# Patient Record
Sex: FEMALE | Race: WHITE | NOT HISPANIC OR LATINO | Employment: UNEMPLOYED | URBAN - METROPOLITAN AREA
[De-identification: names, ages, dates, MRNs, and addresses within clinical notes are randomized per-mention and may not be internally consistent; named-entity substitution may affect disease eponyms.]

---

## 2017-05-22 ENCOUNTER — TELEPHONE (OUTPATIENT)
Dept: FAMILY MEDICINE | Facility: CLINIC | Age: 42
End: 2017-05-22

## 2017-05-22 NOTE — TELEPHONE ENCOUNTER
----- Message from Kylie Casas MD sent at 5/19/2017 11:28 AM CDT -----  please notify pt need repeat mammogram in 6 months

## 2017-06-12 RX ORDER — PHENYTOIN SODIUM 100 MG/1
100 CAPSULE, EXTENDED RELEASE ORAL 2 TIMES DAILY
COMMUNITY
End: 2017-06-27 | Stop reason: SDUPTHER

## 2017-06-12 RX ORDER — OMEPRAZOLE 20 MG/1
20 CAPSULE, DELAYED RELEASE ORAL DAILY
COMMUNITY
End: 2017-06-27 | Stop reason: SDUPTHER

## 2017-06-12 RX ORDER — ONDANSETRON 4 MG/1
4 TABLET, ORALLY DISINTEGRATING ORAL EVERY 6 HOURS PRN
COMMUNITY
End: 2017-06-27 | Stop reason: SDUPTHER

## 2017-06-12 RX ORDER — ALBUTEROL SULFATE 90 UG/1
1 AEROSOL, METERED RESPIRATORY (INHALATION)
COMMUNITY
End: 2017-06-27 | Stop reason: SDUPTHER

## 2017-06-27 ENCOUNTER — OFFICE VISIT (OUTPATIENT)
Dept: FAMILY MEDICINE | Facility: CLINIC | Age: 42
End: 2017-06-27
Payer: MEDICAID

## 2017-06-27 VITALS
SYSTOLIC BLOOD PRESSURE: 114 MMHG | HEART RATE: 81 BPM | WEIGHT: 225 LBS | OXYGEN SATURATION: 96 % | BODY MASS INDEX: 38.41 KG/M2 | HEIGHT: 64 IN | DIASTOLIC BLOOD PRESSURE: 70 MMHG

## 2017-06-27 DIAGNOSIS — J44.9 CHRONIC OBSTRUCTIVE PULMONARY DISEASE, UNSPECIFIED COPD TYPE: ICD-10-CM

## 2017-06-27 DIAGNOSIS — G40.909 SEIZURE DISORDER: Primary | ICD-10-CM

## 2017-06-27 DIAGNOSIS — R11.0 NAUSEA: ICD-10-CM

## 2017-06-27 DIAGNOSIS — K21.9 GASTROESOPHAGEAL REFLUX DISEASE, ESOPHAGITIS PRESENCE NOT SPECIFIED: ICD-10-CM

## 2017-06-27 PROBLEM — M54.50 CHRONIC LOW BACK PAIN: Status: ACTIVE | Noted: 2017-06-27

## 2017-06-27 PROBLEM — G89.29 CHRONIC LOW BACK PAIN: Status: ACTIVE | Noted: 2017-06-27

## 2017-06-27 PROBLEM — R92.8 ABNORMAL FINDING ON MAMMOGRAPHY: Status: ACTIVE | Noted: 2017-06-27

## 2017-06-27 PROBLEM — F41.8 MIXED ANXIETY DEPRESSIVE DISORDER: Status: ACTIVE | Noted: 2017-06-27

## 2017-06-27 PROBLEM — F17.200 CURRENT SMOKER: Status: ACTIVE | Noted: 2017-06-27

## 2017-06-27 PROCEDURE — 99214 OFFICE O/P EST MOD 30 MIN: CPT | Mod: ,,, | Performed by: FAMILY MEDICINE

## 2017-06-27 RX ORDER — ALBUTEROL SULFATE 90 UG/1
1 AEROSOL, METERED RESPIRATORY (INHALATION)
Qty: 18 G | Refills: 5 | Status: SHIPPED | OUTPATIENT
Start: 2017-06-27 | End: 2017-12-12 | Stop reason: SDUPTHER

## 2017-06-27 RX ORDER — OMEPRAZOLE 20 MG/1
20 CAPSULE, DELAYED RELEASE ORAL DAILY
Qty: 30 CAPSULE | Refills: 5 | Status: SHIPPED | OUTPATIENT
Start: 2017-06-27 | End: 2019-05-16

## 2017-06-27 RX ORDER — PHENYTOIN SODIUM 100 MG/1
100 CAPSULE, EXTENDED RELEASE ORAL 2 TIMES DAILY
Qty: 60 CAPSULE | Refills: 5 | Status: SHIPPED | OUTPATIENT
Start: 2017-06-27 | End: 2017-12-12 | Stop reason: SDUPTHER

## 2017-06-27 RX ORDER — ONDANSETRON 4 MG/1
4 TABLET, ORALLY DISINTEGRATING ORAL EVERY 6 HOURS PRN
Qty: 30 TABLET | Refills: 2 | Status: SHIPPED | OUTPATIENT
Start: 2017-06-27 | End: 2017-12-12 | Stop reason: SDUPTHER

## 2017-06-27 NOTE — PROGRESS NOTES
Subjective:       Patient ID:  Jak Cody is a 41 y.o. female with multiple medical diagnoses as listed in the medical history and problem list that presents for COPD and Nausea  .      Chief Complaint: COPD and Nausea    COPD: She presents for evaluation and treatment of COPD. Symptoms include dry cough. Symptoms began several years ago, stable since that time.  She denies no cough.  Associated symptoms include The patient reports adherence to the treatment of albuterol inhalor.  COPD History:  The last exacerbation occurred several years ago. Typical exacerbations consist of dyspnea and usually last several day.        Nausea   This is a recurrent problem. The current episode started more than 1 year ago. The problem occurs intermittently. The problem has been waxing and waning. Pertinent negatives include no abdominal pain, anorexia, arthralgias, chest pain, chills, congestion, coughing, diaphoresis, fatigue, fever, headaches, myalgias, nausea, rash, sore throat, vomiting or weakness. Nothing aggravates the symptoms. Treatments tried: zofran prn  The treatment provided mild relief.   Seizures    This is a chronic problem. The current episode started more than 1 week ago. The problem has not changed since onset.There were 2 to 3 seizures. Pertinent negatives include no confusion, no headaches, no visual disturbance, no sore throat, no chest pain, no cough, no nausea, no vomiting and no diarrhea. Possible causes do not include missed seizure meds.     Review of Systems   Constitutional: Negative for appetite change, chills, diaphoresis, fatigue and fever.   HENT: Negative for congestion, ear pain, hearing loss, sore throat and trouble swallowing.    Eyes: Negative for photophobia, pain and visual disturbance.   Respiratory: Negative for cough, chest tightness and shortness of breath.    Cardiovascular: Negative for chest pain, palpitations and leg swelling.   Gastrointestinal: Negative for abdominal pain,  anorexia, blood in stool, constipation, diarrhea, nausea and vomiting.   Endocrine: Negative for cold intolerance and heat intolerance.   Genitourinary: Negative for difficulty urinating, flank pain, pelvic pain and vaginal pain.   Musculoskeletal: Negative for arthralgias and myalgias.   Skin: Negative for rash.   Allergic/Immunologic: Negative for immunocompromised state.   Neurological: Negative for dizziness, seizures, weakness, light-headedness and headaches.   Hematological: Negative for adenopathy. Does not bruise/bleed easily.   Psychiatric/Behavioral: Negative for confusion, self-injury and suicidal ideas.       Past Medical History:   Diagnosis Date    Anxiety     COPD (chronic obstructive pulmonary disease)     Depression     GERD (gastroesophageal reflux disease)     Seizures      Past Surgical History:   Procedure Laterality Date    FRACTURE SURGERY  2000    left femur     Family History   Problem Relation Age of Onset    Arthritis Mother     Asthma Mother     COPD Mother     Depression Mother     Diabetes Mother     Headaches Mother     Cancer Maternal Grandfather     Heart disease Paternal Grandfather     Heart attack Paternal Grandfather      Social History     Social History    Marital status: Single     Spouse name: N/A    Number of children: N/A    Years of education: N/A     Social History Main Topics    Smoking status: Current Every Day Smoker     Packs/day: 0.50     Types: Cigarettes    Smokeless tobacco: Never Used    Alcohol use No    Drug use: No    Sexual activity: Not Asked     Other Topics Concern    None     Social History Narrative    None     Current Outpatient Prescriptions   Medication Sig Dispense Refill    albuterol (VENTOLIN HFA) 90 mcg/actuation inhaler Inhale 1 puff into the lungs every 4 to 6 hours as needed for Wheezing. Rescue 18 g 5    omeprazole (PRILOSEC) 20 MG capsule Take 1 capsule (20 mg total) by mouth once daily. 30 capsule 5    ondansetron  (ZOFRAN-ODT) 4 MG TbDL Take 1 tablet (4 mg total) by mouth every 6 (six) hours as needed. 30 tablet 2    phenytoin (DILANTIN) 100 MG ER capsule Take 1 capsule (100 mg total) by mouth 2 (two) times daily. 60 capsule 5     No current facility-administered medications for this visit.      Review of patient's allergies indicates:  No Known Allergies  Objective:      Vitals:    06/27/17 1338   BP: 114/70   Pulse: 81     Physical Exam   Constitutional: She appears well-developed and well-nourished.   HENT:   Head: Normocephalic.   Eyes: Pupils are equal, round, and reactive to light. Right eye exhibits no discharge. Left eye exhibits no discharge. No scleral icterus.   Neck: No thyromegaly present.   Cardiovascular: Normal rate and regular rhythm.  Exam reveals no gallop and no friction rub.    No murmur heard.  Pulmonary/Chest: No respiratory distress. She has no wheezes. She has no rales. She exhibits no tenderness.   Abdominal: She exhibits no distension. There is no tenderness.   Musculoskeletal: She exhibits no tenderness.   Skin: No erythema.   Psychiatric: She has a normal mood and affect.       Assessment:       1. Seizure disorder    2. Nausea    3. Gastroesophageal reflux disease, esophagitis presence not specified    4. Chronic obstructive pulmonary disease, unspecified COPD type        Plan:       Seizure disorder  Comments:  > 10 years, need follow up with neurology.   Orders:  -     Ambulatory Referral to Neurology  -     phenytoin (DILANTIN) 100 MG ER capsule; Take 1 capsule (100 mg total) by mouth 2 (two) times daily.  Dispense: 60 capsule; Refill: 5    Nausea  Comments:  zofran PRN   Orders:  -     ondansetron (ZOFRAN-ODT) 4 MG TbDL; Take 1 tablet (4 mg total) by mouth every 6 (six) hours as needed.  Dispense: 30 tablet; Refill: 2    Gastroesophageal reflux disease, esophagitis presence not specified  Comments:  on PPI.   Orders:  -     omeprazole (PRILOSEC) 20 MG capsule; Take 1 capsule (20 mg total) by  mouth once daily.  Dispense: 30 capsule; Refill: 5    Chronic obstructive pulmonary disease, unspecified COPD type  Comments:  mild , only albuterol prn  Orders:  -     albuterol (VENTOLIN HFA) 90 mcg/actuation inhaler; Inhale 1 puff into the lungs every 4 to 6 hours as needed for Wheezing. Rescue  Dispense: 18 g; Refill: 5      Return in about 6 months (around 12/27/2017) for copd , seizure, , lab review, [place].      6/27/2017 Kylie Casas M.D.

## 2017-06-30 ENCOUNTER — TELEPHONE (OUTPATIENT)
Dept: FAMILY MEDICINE | Facility: CLINIC | Age: 42
End: 2017-06-30

## 2017-06-30 DIAGNOSIS — G40.909 SEIZURE DISORDER: Primary | ICD-10-CM

## 2017-06-30 DIAGNOSIS — F41.8 MIXED ANXIETY DEPRESSIVE DISORDER: ICD-10-CM

## 2017-07-07 ENCOUNTER — TELEPHONE (OUTPATIENT)
Dept: NEUROLOGY | Facility: CLINIC | Age: 42
End: 2017-07-07

## 2017-07-07 NOTE — TELEPHONE ENCOUNTER
----- Message from Jennifer Holcomb sent at 7/7/2017 10:09 AM CDT -----  Contact: Patient  Patient returning call. Please call back at [phone]. Thanks,

## 2017-07-07 NOTE — TELEPHONE ENCOUNTER
----- Message from Ingrid Bernardo sent at 7/6/2017  1:02 PM CDT -----  Contact: self  Patient has a referral in her chart from Dr. PETERSON   Please call her at  to schedule or advise.     Thanks

## 2017-07-07 NOTE — TELEPHONE ENCOUNTER
Left message to call back to discuss being placed on the wait list as we do not have any Medicaid openings at this time.

## 2017-07-18 ENCOUNTER — TELEPHONE (OUTPATIENT)
Dept: FAMILY MEDICINE | Facility: CLINIC | Age: 42
End: 2017-07-18

## 2017-07-18 DIAGNOSIS — G40.909 SEIZURE DISORDER: Primary | ICD-10-CM

## 2017-07-18 NOTE — TELEPHONE ENCOUNTER
When pt. went to schedule w/Dr. Zarate but he did not have any openings for Medicaid pts. Pt. called her ins. co. & they told her to go to Dr. Isael Kimble (neuro) in [place]. Pt. needs referral for that dr. He is w/[facility].

## 2017-08-11 ENCOUNTER — TELEPHONE (OUTPATIENT)
Dept: FAMILY MEDICINE | Facility: CLINIC | Age: 42
End: 2017-08-11

## 2017-08-11 NOTE — TELEPHONE ENCOUNTER
----- Message from Linh Ruby MA sent at 8/11/2017 10:36 AM CDT -----  Contact: Farhana @ [facility] [phone]  She called regarding this patient because she was referred to go see an Neurologist but they don't have any openings since the patient has [facility] medicaid. I think you need to call the patient and tell them to call her insurance company to see who else she can go see. If not she can call Farhana at the number above and schedule that for the beginning of the year.

## 2017-12-12 ENCOUNTER — OFFICE VISIT (OUTPATIENT)
Dept: FAMILY MEDICINE | Facility: CLINIC | Age: 42
End: 2017-12-12
Payer: MEDICAID

## 2017-12-12 VITALS
RESPIRATION RATE: 20 BRPM | BODY MASS INDEX: 37.39 KG/M2 | SYSTOLIC BLOOD PRESSURE: 104 MMHG | HEART RATE: 81 BPM | HEIGHT: 64 IN | DIASTOLIC BLOOD PRESSURE: 70 MMHG | WEIGHT: 219 LBS | OXYGEN SATURATION: 98 %

## 2017-12-12 DIAGNOSIS — J45.21 MILD INTERMITTENT ASTHMATIC BRONCHITIS WITH ACUTE EXACERBATION: ICD-10-CM

## 2017-12-12 DIAGNOSIS — K21.9 GASTROESOPHAGEAL REFLUX DISEASE WITHOUT ESOPHAGITIS: ICD-10-CM

## 2017-12-12 DIAGNOSIS — R11.0 NAUSEA: ICD-10-CM

## 2017-12-12 DIAGNOSIS — J44.9 CHRONIC OBSTRUCTIVE PULMONARY DISEASE, UNSPECIFIED COPD TYPE: ICD-10-CM

## 2017-12-12 DIAGNOSIS — G40.909 SEIZURE DISORDER: Primary | ICD-10-CM

## 2017-12-12 DIAGNOSIS — G40.909 SEIZURE DISORDER: ICD-10-CM

## 2017-12-12 DIAGNOSIS — Z72.0 TOBACCO ABUSE: ICD-10-CM

## 2017-12-12 PROCEDURE — 99214 OFFICE O/P EST MOD 30 MIN: CPT | Mod: ,,, | Performed by: INTERNAL MEDICINE

## 2017-12-12 RX ORDER — ONDANSETRON 4 MG/1
4 TABLET, ORALLY DISINTEGRATING ORAL EVERY 6 HOURS PRN
Qty: 30 TABLET | Refills: 2 | Status: SHIPPED | OUTPATIENT
Start: 2017-12-12

## 2017-12-12 RX ORDER — ALBUTEROL SULFATE 90 UG/1
1 AEROSOL, METERED RESPIRATORY (INHALATION)
Qty: 18 G | Refills: 5 | Status: SHIPPED | OUTPATIENT
Start: 2017-12-12 | End: 2019-03-01 | Stop reason: SDUPTHER

## 2017-12-12 RX ORDER — PHENYTOIN SODIUM 100 MG/1
100 CAPSULE, EXTENDED RELEASE ORAL 3 TIMES DAILY
Qty: 90 CAPSULE | Refills: 5 | Status: SHIPPED | OUTPATIENT
Start: 2017-12-12 | End: 2018-10-17 | Stop reason: SDUPTHER

## 2017-12-12 NOTE — PATIENT INSTRUCTIONS
Tips to Control Acid Reflux    To control acid reflux, youll need to make some basic diet and lifestyle changes. The simple steps outlined below may be all youll need to ease discomfort.  Watch what you eat  · Avoid fatty foods and spicy foods.  · Eat fewer acidic foods, such as citrus and tomato-based foods. These can increase symptoms.  · Limit drinking alcohol, caffeine, and fizzy beverages. All increase acid reflux.  · Try limiting chocolate, peppermint, and spearmint. These can worsen acid reflux in some people.  Watch when you eat  · Avoid lying down for 3 hours after eating.  · Do not snack before going to bed.  Raise your head  Raising your head and upper body by 4 to 6 inches helps limit reflux when youre lying down. Put blocks under the head of your bed frame to raise it.  Other changes  · Lose weight, if you need to  · Dont exercise near bedtime  · Avoid tight-fitting clothes  · Limit aspirin and ibuprofen  · Stop smoking   Date Last Reviewed: 7/1/2016  © 2412-2661 [employer]. [address] [place] [address], [place] [address]. All rights reserved. This information is not intended as a substitute for professional medical care. Always follow your healthcare professional's instructions.

## 2017-12-12 NOTE — PROGRESS NOTES
Subjective:       Patient ID: Jak Cody is a 42 y.o. female.    Chief Complaint: Asthma; Gastroesophageal Reflux; COPD; and Seizures    Asthma   There is no cough or shortness of breath. Associated symptoms include heartburn. Pertinent negatives include no appetite change, chest pain, ear pain, fever, headaches, myalgias, sore throat or trouble swallowing. Her past medical history is significant for asthma and COPD.   Gastroesophageal Reflux   She complains of heartburn. She reports no abdominal pain, no chest pain, no coughing, no nausea or no sore throat. The heartburn does not wake her from sleep. The symptoms are aggravated by smoking and stress. Pertinent negatives include no fatigue. She has tried a PPI for the symptoms.   COPD   This is a chronic problem. Pertinent negatives include no abdominal pain, arthralgias, chest pain, chills, congestion, coughing, diaphoresis, fatigue, fever, headaches, myalgias, nausea, rash, sore throat, vomiting or weakness.   Seizures    This is a chronic problem. The problem has not changed since onset.Pertinent negatives include no confusion, no headaches, no visual disturbance, no sore throat, no chest pain, no cough, no nausea, no vomiting and no diarrhea. Characteristics do not include eye blinking. There has been no fever.       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, controlled     Seizures      Social History     Social History    Marital status: Single     Spouse name: N/A    Number of children: N/A    Years of education: N/A     Occupational History    disabled      Social History Main Topics    Smoking status: Current Every Day Smoker     Packs/day: 0.50     Types: Cigarettes    Smokeless tobacco: Never Used    Alcohol use No    Drug use: No    Sexual activity: Not on file     Other Topics Concern    Not on  "file     Social History Narrative    No narrative on file     Past Surgical History:   Procedure Laterality Date    FRACTURE SURGERY  2000    left femur     Family History   Problem Relation Age of Onset    Arthritis Mother     Asthma Mother     COPD Mother     Depression Mother     Diabetes Mother     Headaches Mother     Cancer Maternal Grandfather     Heart disease Paternal Grandfather     Heart attack Paternal Grandfather        Review of Systems   Constitutional: Negative for appetite change, chills, diaphoresis, fatigue and fever.   HENT: Negative for congestion, ear pain, hearing loss, sore throat and trouble swallowing.    Eyes: Negative for photophobia, pain and visual disturbance.   Respiratory: Negative for cough, chest tightness and shortness of breath.         COPD/Asthma   Cardiovascular: Negative for chest pain, palpitations and leg swelling.   Gastrointestinal: Positive for heartburn. Negative for abdominal pain, blood in stool, constipation, diarrhea, nausea and vomiting.        GERD   Endocrine: Negative for cold intolerance and heat intolerance.   Genitourinary: Negative for difficulty urinating and flank pain.   Musculoskeletal: Negative for arthralgias and myalgias.   Skin: Negative for rash.   Allergic/Immunologic: Negative for immunocompromised state.   Neurological: Positive for seizures. Negative for dizziness, weakness, light-headedness and headaches.   Hematological: Negative for adenopathy. Does not bruise/bleed easily.   Psychiatric/Behavioral: Negative for confusion, self-injury and suicidal ideas.       Objective:       Vitals:    12/12/17 0837 12/12/17 0925   BP: 104/70    Pulse: 62 81   Resp: 16 20   SpO2: 98% 98%   Weight: 99.3 kg (219 lb)    Height: 5' 4" (1.626 m)    PF: 400 L/min      Physical Exam   Constitutional: She is oriented to person, place, and time. She appears well-developed and well-nourished.   HENT:   Head: Normocephalic.   Mouth/Throat: Abnormal " dentition (poor hygiene).   Eyes: Pupils are equal, round, and reactive to light. Right eye exhibits no discharge. Left eye exhibits no discharge. No scleral icterus.   Neck: No thyromegaly present.   Cardiovascular: Normal rate and regular rhythm.  Exam reveals no gallop and no friction rub.    No murmur heard.  Pulmonary/Chest: No respiratory distress. She has no wheezes. She has no rales. She exhibits no tenderness.   Abdominal: She exhibits no distension. There is no tenderness.   Musculoskeletal: Normal range of motion. She exhibits no tenderness.   Neurological: She is alert and oriented to person, place, and time.   Skin: No erythema.   Psychiatric: Her affect is labile.   Gest angry easily - non specific anger and not Physician directed       Assessment:       1. Seizure disorder    2. Gastroesophageal reflux disease without esophagitis    3. Mild intermittent asthmatic bronchitis with acute exacerbation    4. Chronic obstructive pulmonary disease, unspecified COPD type    5. Seizure disorder    6. Nausea    7. Tobacco abuse         Plan:           Seizure disorder  -     Comprehensive metabolic panel; Future; Expected date: 12/12/2017  -     CBC auto differential; Future; Expected date: 12/12/2017  -     Phenytoin level, total; Future; Expected date: 12/12/2017  -     phenytoin (DILANTIN) 100 MG ER capsule; Take 1 capsule (100 mg total) by mouth 3 (three) times daily.  Dispense: 90 capsule; Refill: 5    Gastroesophageal reflux disease without esophagitis    Mild intermittent asthmatic bronchitis with acute exacerbation    Chronic obstructive pulmonary disease, unspecified COPD type  Comments:  mild , only albuterol prn  Orders:  -     albuterol (VENTOLIN HFA) 90 mcg/actuation inhaler; Inhale 1 puff into the lungs every 4 to 6 hours as needed for Wheezing. Rescue  Dispense: 18 g; Refill: 5    Seizure disorder  Comments:  > 10 years, need follow up with neurology.   Orders:  -     Comprehensive metabolic  panel; Future; Expected date: 12/12/2017  -     CBC auto differential; Future; Expected date: 12/12/2017  -     Phenytoin level, total; Future; Expected date: 12/12/2017  -     phenytoin (DILANTIN) 100 MG ER capsule; Take 1 capsule (100 mg total) by mouth 3 (three) times daily.  Dispense: 90 capsule; Refill: 5    Nausea  Comments:  zofran PRN   Orders:  -     ondansetron (ZOFRAN-ODT) 4 MG TbDL; Take 1 tablet (4 mg total) by mouth every 6 (six) hours as needed.  Dispense: 30 tablet; Refill: 2    Tobacco abuse    Patient has been advised to slowly cut down on caffeine at this point. Eventually she'll have to quit smoking but she has to  one Cerda at a time. Cutting down on caffeine will help her with her reflux symptoms and less dependency upon nausea medications and omeprazole. This might help better absorption of seizure medication.    I would like her to get opinions from an epileptic neurologist or neurologist for her seizures but Medicaid is not accepted in this part of the [place].    I will see her back in 3 months time.    Pt seen with Ms Sexton

## 2018-02-22 ENCOUNTER — TELEPHONE (OUTPATIENT)
Dept: FAMILY MEDICINE | Facility: CLINIC | Age: 43
End: 2018-02-22

## 2018-02-22 DIAGNOSIS — G40.909 SEIZURE DISORDER: Primary | ICD-10-CM

## 2018-03-13 ENCOUNTER — OFFICE VISIT (OUTPATIENT)
Dept: FAMILY MEDICINE | Facility: CLINIC | Age: 43
End: 2018-03-13
Payer: MEDICAID

## 2018-03-13 VITALS
DIASTOLIC BLOOD PRESSURE: 73 MMHG | SYSTOLIC BLOOD PRESSURE: 101 MMHG | HEIGHT: 64 IN | HEART RATE: 70 BPM | BODY MASS INDEX: 37.73 KG/M2 | WEIGHT: 221 LBS

## 2018-03-13 DIAGNOSIS — M25.562 CHRONIC PAIN OF LEFT KNEE: Primary | ICD-10-CM

## 2018-03-13 DIAGNOSIS — G40.909 SEIZURE DISORDER: ICD-10-CM

## 2018-03-13 DIAGNOSIS — Z72.0 TOBACCO ABUSE: ICD-10-CM

## 2018-03-13 DIAGNOSIS — M25.552 PAIN OF LEFT HIP JOINT: ICD-10-CM

## 2018-03-13 DIAGNOSIS — G89.29 CHRONIC PAIN OF LEFT KNEE: Primary | ICD-10-CM

## 2018-03-13 DIAGNOSIS — F12.90 MARIJUANA USER: ICD-10-CM

## 2018-03-13 DIAGNOSIS — R41.3 MEMORY LOSS: ICD-10-CM

## 2018-03-13 PROCEDURE — 99214 OFFICE O/P EST MOD 30 MIN: CPT | Mod: ,,, | Performed by: INTERNAL MEDICINE

## 2018-03-13 RX ORDER — NAPROXEN 500 MG/1
500 TABLET ORAL 2 TIMES DAILY PRN
Qty: 30 TABLET | Refills: 2 | Status: SHIPPED | OUTPATIENT
Start: 2018-03-13 | End: 2019-01-07 | Stop reason: ALTCHOICE

## 2018-03-13 NOTE — PATIENT INSTRUCTIONS
Chronic Pain  Pain serves an important role. It lets you know something is wrong that needs your attention. When the body heals, pain normally goes away.  When pain lasts longer than six months, it is called chronic pain. This is pain that is present even after the body has healed. Chronic pain can cause mood problems and get in the way of your relationships and your daily life.  A number of conditions can cause chronic pain. Some of the more common include:  · Previous surgery  · An old injury  · Infection  · Diseases such as diabetes  · Nerve damage  · Back injury  · Arthritis  · Migraine or other headaches  · Fibromyalgia  · Cancer  Depression and stress can make chronic pain symptoms worse. In some cases, a cause for the pain cannot be found.   Treatment  Treatment can greatly reduce pain. In many cases, pain can become less severe, occur less often, and interfere less with your daily life. Chronic pain is often treated with a combination of medicines, therapies, and lifestyle changes. You will work closely with your healthcare provider to find a treatment plan that works best for you.  · Ask your healthcare provider for a referral to a pain management specialty center. These can provide the most recent and proven pain management strategies, along with emotional support and comprehensive services.  · Several different types of medicines may be prescribed for chronic pain. Work with your healthcare provider to develop a medicine plan that helps manage your pain.  · Physical therapy can be very effective in helping reduce certain types of chronic pain.  · Occupational therapy teaches you how to do routine tasks of daily living in ways that lessen your discomfort.  · Psychological therapy can help you cope better with stress and pain.  · Other therapies such as meditation, yoga, biofeedback, massage, and acupuncture can also help manage chronic pain.  · Changing certain habits can help reduce chronic pain. They  include:  ¨ Eating healthy  ¨ Developing an exercise routine  ¨ Getting enough sleep at night  ¨ Stopping smoking and limiting alcohol use  ¨ Losing excess weight  Follow-up care  Follow up with your healthcare provider as advised. Let your healthcare provider know if your current treatment plan is working or if changes are needed.  Resources  For more information, contact:  · American [place] for Headache Society www.achenet.org  · American Chronic Pain Association www.theacpa.org [phone]  Date Last Reviewed: 7/26/2015 © 2000-2017 [employer]. [address] [place] [address]. All rights reserved. This information is not intended as a substitute for professional medical care. Always follow your healthcare professional's instructions.

## 2018-03-26 ENCOUNTER — TELEPHONE (OUTPATIENT)
Dept: FAMILY MEDICINE | Facility: CLINIC | Age: 43
End: 2018-03-26

## 2018-03-26 DIAGNOSIS — G89.29 CHRONIC PAIN OF LEFT KNEE: ICD-10-CM

## 2018-03-26 DIAGNOSIS — M25.552 PAIN OF LEFT HIP JOINT: Primary | ICD-10-CM

## 2018-03-26 DIAGNOSIS — M25.562 CHRONIC PAIN OF LEFT KNEE: ICD-10-CM

## 2018-03-26 NOTE — TELEPHONE ENCOUNTER
----- Message from Reymundo Serra MD sent at 3/23/2018  7:42 PM CDT -----  Knee X Ray is ok. Hip shows some loosening of hardware. She may need to see ortho

## 2018-04-03 DIAGNOSIS — E55.9 HYPOVITAMINOSIS D: Primary | ICD-10-CM

## 2018-04-03 RX ORDER — ERGOCALCIFEROL 1.25 MG/1
50000 CAPSULE ORAL
Qty: 1 CAPSULE | Refills: 11
Start: 2018-04-03

## 2018-04-04 ENCOUNTER — OFFICE VISIT (OUTPATIENT)
Dept: ORTHOPEDICS | Facility: CLINIC | Age: 43
End: 2018-04-04
Payer: MEDICAID

## 2018-04-04 VITALS
HEIGHT: 64 IN | SYSTOLIC BLOOD PRESSURE: 106 MMHG | WEIGHT: 221 LBS | HEART RATE: 89 BPM | BODY MASS INDEX: 37.73 KG/M2 | DIASTOLIC BLOOD PRESSURE: 70 MMHG

## 2018-04-04 DIAGNOSIS — M25.552 PAIN OF LEFT HIP JOINT: Primary | ICD-10-CM

## 2018-04-04 PROCEDURE — 99203 OFFICE O/P NEW LOW 30 MIN: CPT | Mod: 25,,, | Performed by: ORTHOPAEDIC SURGERY

## 2018-04-04 PROCEDURE — 20610 DRAIN/INJ JOINT/BURSA W/O US: CPT | Mod: LT,,, | Performed by: ORTHOPAEDIC SURGERY

## 2018-04-04 RX ORDER — TRIAMCINOLONE ACETONIDE 40 MG/ML
40 INJECTION, SUSPENSION INTRA-ARTICULAR; INTRAMUSCULAR
Status: DISCONTINUED | OUTPATIENT
Start: 2018-04-04 | End: 2018-04-04 | Stop reason: HOSPADM

## 2018-04-04 RX ORDER — PHENYTOIN SODIUM 100 MG/1
100 CAPSULE, EXTENDED RELEASE ORAL
COMMUNITY
End: 2018-04-04

## 2018-04-04 RX ADMIN — TRIAMCINOLONE ACETONIDE 40 MG: 40 INJECTION, SUSPENSION INTRA-ARTICULAR; INTRAMUSCULAR at 09:04

## 2018-04-04 NOTE — LETTER
April 4, 2018      Reymundo Serra MD at [facility] - Orthopedic Surgery  [address] [place] [address]  [address] [place] [id number]  Phone: [phone]  Fax: [phone]          Patient: Jak Cody   MR Number: [id number]   YOB: 1975   Date of Visit: 4/4/2018       Dear Dr. Reymundo Serra:    Thank you for referring Jak Cody to me for evaluation. Attached you will find relevant portions of my assessment and plan of care.    If you have questions, please do not hesitate to call me. I look forward to following Jak Cody along with you.    Sincerely,    Mykel Nicole MD    Enclosure  CC:  No Recipients    If you would like to receive this communication electronically, please contact [email or web address] or [phone] to request more information on [facility] access.    For providers and/or their staff who would like to refer a patient to [facility], please contact us through our one-stop-shop provider referral line, [facility], at [phone].    If you feel you have received this communication in error or would no longer like to receive these types of communications, please e-mail [email or web address]

## 2018-04-04 NOTE — PROCEDURES
Large Joint Aspiration/Injection  Date/Time: 4/4/2018 9:11 AM  Performed by: MILDRED CENTENO  Authorized by: MILDRED CENTENO     Consent Done?:  Yes (Verbal)  Indications:  Pain  Procedure site marked: Yes    Timeout: Prior to procedure the correct patient, procedure, and site was verified      Location:  Hip  Site:  L greater trochanteric bursa  Prep: Patient was prepped and draped in usual sterile fashion    Needle size:  22 G  Medications:  40 mg triamcinolone acetonide 40 mg/mL  Patient tolerance:  Patient tolerated the procedure well with no immediate complications
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
## 2018-10-17 ENCOUNTER — OFFICE VISIT (OUTPATIENT)
Dept: FAMILY MEDICINE | Facility: CLINIC | Age: 43
End: 2018-10-17
Payer: MEDICAID

## 2018-10-17 VITALS
TEMPERATURE: 99 F | SYSTOLIC BLOOD PRESSURE: 109 MMHG | DIASTOLIC BLOOD PRESSURE: 77 MMHG | WEIGHT: 215 LBS | HEART RATE: 79 BPM | BODY MASS INDEX: 36.7 KG/M2 | HEIGHT: 64 IN

## 2018-10-17 DIAGNOSIS — G40.909 SEIZURE DISORDER: ICD-10-CM

## 2018-10-17 DIAGNOSIS — J02.9 ACUTE PHARYNGITIS, UNSPECIFIED ETIOLOGY: Primary | ICD-10-CM

## 2018-10-17 PROCEDURE — 99213 OFFICE O/P EST LOW 20 MIN: CPT | Mod: ,,, | Performed by: INTERNAL MEDICINE

## 2018-10-17 RX ORDER — AMOXICILLIN 500 MG/1
500 CAPSULE ORAL EVERY 8 HOURS
Qty: 21 CAPSULE | Refills: 0 | Status: SHIPPED | OUTPATIENT
Start: 2018-10-17 | End: 2019-01-07

## 2018-10-17 RX ORDER — PHENYTOIN SODIUM 100 MG/1
300 CAPSULE, EXTENDED RELEASE ORAL DAILY
Qty: 90 CAPSULE | Refills: 2 | Status: SHIPPED | OUTPATIENT
Start: 2018-10-17 | End: 2019-05-16 | Stop reason: SDUPTHER

## 2018-10-17 NOTE — PROGRESS NOTES
Subjective:       Patient ID: Jak Cody is a 42 y.o. female.    Chief Complaint: Sore Throat (3 days ) and Seizures    Sore Throat    This is a chronic problem. The current episode started in the past 7 days. The problem has been unchanged. Neither side of throat is experiencing more pain than the other. The maximum temperature recorded prior to her arrival was 100.4 - 100.9 F. Pertinent negatives include no abdominal pain, congestion, coughing, ear pain, shortness of breath or trouble swallowing. The treatment provided no relief.   Pt  states that she is immunocompromised. She needs antibiotics. As to why she is immune compromised is unclear to me. She does smoke cigarettes. She is not on any steroids or any defined immunocompromising conditions like IgE deficiency or AIDS. He is not known to have any malignancy and she is not on any chemotherapy.  Patient's history of seizure disorder has been noted. She is on Dilantin for the same. Seizure disorder has been for several years. She is currently stable. She has been advised to set up an appointment with a neurologist in past but has not really made any concrete attempts to do so.  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, controlled     Hypovitaminosis D 3/28/2018    As per Neurlogy notes at [facility] -Dr Rochelle Bennett MD neurology started on Vitamin D 50,000    Seizures      Social History     Socioeconomic History    Marital status: Single     Spouse name: Not on file    Number of children: Not on file    Years of education: Not on file    Highest education level: Not on file   Social Needs    Financial resource strain: Not on file    Food insecurity - worry: Not on file    Food insecurity - inability: Not on file    Transportation needs - medical: Not on file    Transportation needs - non-medical: Not on  "file   Occupational History    Occupation: disabled   Tobacco Use    Smoking status: Current Every Day Smoker     Packs/day: 0.50     Types: Cigarettes    Smokeless tobacco: Never Used   Substance and Sexual Activity    Alcohol use: No    Drug use: No    Sexual activity: Not on file   Other Topics Concern    Not on file   Social History Narrative    Not on file     Past Surgical History:   Procedure Laterality Date    FRACTURE SURGERY  2000    left femur     Family History   Problem Relation Age of Onset    Arthritis Mother     Asthma Mother     COPD Mother     Depression Mother     Diabetes Mother     Headaches Mother     Cancer Maternal Grandfather     Heart disease Paternal Grandfather     Heart attack Paternal Grandfather        Review of Systems   Constitutional: Negative for appetite change, chills, diaphoresis, fatigue and fever.   HENT: Positive for sore throat. Negative for congestion, ear pain, hearing loss and trouble swallowing.    Eyes: Negative for photophobia, pain and visual disturbance.   Respiratory: Negative for cough, chest tightness and shortness of breath.         COPD/Asthma   Cardiovascular: Negative for chest pain, palpitations and leg swelling.   Gastrointestinal: Negative for abdominal pain and blood in stool.        GERD   Genitourinary: Negative for difficulty urinating.   Musculoskeletal: Negative for arthralgias and myalgias.   Skin: Negative for rash.   Neurological: Positive for seizures. Negative for weakness.   Hematological: Negative for adenopathy. Does not bruise/bleed easily.   Psychiatric/Behavioral: Negative for self-injury and suicidal ideas.         Objective:      Blood pressure 109/77, pulse 79, temperature 98.9 °F (37.2 °C), height 5' 4" (1.626 m), weight 97.5 kg (215 lb), last menstrual period 09/19/2018. Body mass index is 36.9 kg/m².  Physical Exam   Constitutional: She appears well-developed and well-nourished.   HENT:   Head: Normocephalic. "   Mouth/Throat: Abnormal dentition (poor hygiene). Posterior oropharyngeal erythema present. No oropharyngeal exudate.   Eyes: Pupils are equal, round, and reactive to light. Right eye exhibits no discharge. No scleral icterus.   Neck: No thyromegaly present.   Cardiovascular: Normal rate and regular rhythm.   Pulmonary/Chest: No respiratory distress. She has no wheezes. She has no rales.   Abdominal: She exhibits no distension. There is no tenderness.   Musculoskeletal: She exhibits no tenderness.   Neurological: She is alert.   Skin: No erythema.         Assessment:       1. Acute pharyngitis, unspecified etiology    2. Seizure disorder           No visits with results within 3 Month(s) from this visit.   Latest known visit with results is:   No results found for any previous visit.         Plan:           Acute pharyngitis, unspecified etiology    Seizure disorder  -     phenytoin (DILANTIN) 100 MG ER capsule; Take 3 capsules (300 mg total) by mouth once daily.  Dispense: 90 capsule; Refill: 2      Patient does have some pharyngitis and erythema. No definite evidence of strep.    She feels that if she does not get amoxicillin the infection was spread all over her body.    She has been referred on Dilantin also but she needs to set up an appointment for that neurologist to continue this care.    Otherwise I will see her back in about 6 months time.  Patient has been advised to quit smoking.    Patient seen with Ms. Jacobsone as chaperone.    Current Outpatient Medications:     albuterol (VENTOLIN HFA) 90 mcg/actuation inhaler, Inhale 1 puff into the lungs every 4 to 6 hours as needed for Wheezing. Rescue, Disp: 18 g, Rfl: 5    ergocalciferol (ERGOCALCIFEROL) 50,000 unit Cap, Take 1 capsule (50,000 Units total) by mouth every 7 days., Disp: 1 capsule, Rfl: 11    naproxen (EC NAPROSYN) 500 MG EC tablet, Take 1 tablet (500 mg total) by mouth 2 (two) times daily as needed (knee pain)., Disp: 30 tablet, Rfl: 2     omeprazole (PRILOSEC) 20 MG capsule, Take 1 capsule (20 mg total) by mouth once daily., Disp: 30 capsule, Rfl: 5    ondansetron (ZOFRAN-ODT) 4 MG TbDL, Take 1 tablet (4 mg total) by mouth every 6 (six) hours as needed., Disp: 30 tablet, Rfl: 2    phenytoin (DILANTIN) 100 MG ER capsule, Take 3 capsules (300 mg total) by mouth once daily., Disp: 90 capsule, Rfl: 2

## 2018-10-17 NOTE — PATIENT INSTRUCTIONS

## 2019-01-07 ENCOUNTER — OFFICE VISIT (OUTPATIENT)
Dept: FAMILY MEDICINE | Facility: CLINIC | Age: 44
End: 2019-01-07
Payer: MEDICAID

## 2019-01-07 VITALS
HEART RATE: 118 BPM | SYSTOLIC BLOOD PRESSURE: 124 MMHG | DIASTOLIC BLOOD PRESSURE: 83 MMHG | HEIGHT: 64 IN | WEIGHT: 218 LBS | BODY MASS INDEX: 37.22 KG/M2

## 2019-01-07 DIAGNOSIS — M25.531 RIGHT WRIST PAIN: Primary | ICD-10-CM

## 2019-01-07 DIAGNOSIS — G56.01 CARPAL TUNNEL SYNDROME OF RIGHT WRIST: ICD-10-CM

## 2019-01-07 PROCEDURE — 99214 PR OFFICE/OUTPT VISIT, EST, LEVL IV, 30-39 MIN: ICD-10-PCS | Mod: ,,, | Performed by: INTERNAL MEDICINE

## 2019-01-07 PROCEDURE — 99214 OFFICE O/P EST MOD 30 MIN: CPT | Mod: ,,, | Performed by: INTERNAL MEDICINE

## 2019-01-07 RX ORDER — MELOXICAM 15 MG/1
15 TABLET ORAL DAILY
Qty: 15 TABLET | Refills: 0 | Status: SHIPPED | OUTPATIENT
Start: 2019-01-07 | End: 2019-05-16

## 2019-01-07 RX ORDER — PREDNISONE 20 MG/1
20 TABLET ORAL DAILY
Qty: 5 TABLET | Refills: 0 | Status: SHIPPED | OUTPATIENT
Start: 2019-01-07 | End: 2019-01-12

## 2019-01-07 NOTE — PATIENT INSTRUCTIONS
Arthralgia    Arthralgia is the term for pain in or around the joint. It is a symptom, not a disease. This pain may involve one or more joints. In some cases, the pain moves from joint to joint.  There are many causes for joint pain. These include:  · Injury  · Osteoarthritis (wearing out of the joint surface)  · Gout (inflammation of the joint due to crystals in the joint fluid)  · Infection inside the joint    · Bursitis (inflammation of the fluid-filled sacs around the joint)  · Autoimmune disorders such as rheumatoid arthritis or lupus  · Tendonitis (inflammation of chords that attach muscle to bone)  Home care  · Rest the involved joint(s) until your symptoms improve.   · You may be prescribed pain medicine. If none is prescribed, you may use acetaminophen or ibuprofen to control pain and inflammation.  Follow-up care  Follow up with your healthcare provider or as advised.  When to seek medical advice  Contact your healthcare provider right away if any of the following occurs:  · Pain, swelling, or redness of joint increases  · Pain worsens or recurs after a period of improvement  · Pain moves to other joints  · You cannot bear weight on the affected joint   · You cannot move the affected joint  · Joint appears deformed  · New rash appears  · Fever of 100.4ºF (38ºC) or higher, or as directed by your healthcare provider  Date Last Reviewed: 3/1/2017  © 2869-8630 [employer]. [address] [place] [address], [place] [id number]. All rights reserved. This information is not intended as a substitute for professional medical care. Always follow your healthcare professional's instructions.        Carpal Tunnel Syndrome    Carpal tunnel syndrome is a painful condition of the wrist and arm. It is caused by pressure on the median nerve.  The median nerve is one of the nerves that give feeling and movement to the hand. It passes through a tunnel in the wrist called the carpal tunnel. This tunnel is made up of bones  and ligaments. Narrowing of this tunnel or swelling of the tissues inside the tunnel puts pressure on the median nerve. This causes numbness, pins and needles, or electric shooting pains in your hand and forearm. Often the pain is worse at night and may wake you when you are asleep.  Carpal tunnel syndrome may occur during pregnancy and with use of birth control pills. It is more common in workers who must often bend their wrists. It is also common in people who work with power tools that cause strong vibrations.  Home care  · Rest the painful wrist. Avoid repeated bending of the wrist back and forth. This puts pressure on the median nerve. Avoid using power tools with strong vibrations.  · If you were given a splint, wear it at night while you sleep. You may also wear it during the day for comfort.  · Move your fingers and wrists often to avoid stiffness.  · Elevate your arms on pillows when you lie down.  · Try using the unaffected hand more.  · Try not to hold your wrists in a bent, downward position.  · Sometimes changes in the work place may ease symptoms. If you type most of the day, it may help to change the position of your keyboard or add a wrist support. Your wrist should be in a neutral position and not bent back when typing.  · You may use over-the-counter pain medicine to treat pain and inflammation, unless another medicine was prescribed. Anti-inflammatory pain medicines, such as ibuprofen or naproxen may be more effective than acetaminophen, which treats pain, but not inflammation. If you have chronic liver or kidney disease or ever had a stomach ulcer or GI bleeding, talk with your doctor before using these medicines.  · Opioid pain medicine will only give temporary relief and does not treat the problem. If pain continues, you may need a shot of a steroid drug into your wrist.  · If the above methods fail, you may need surgery. This will open the carpal tunnel and release the pressure on the trapped  nerve.  Follow-up care  Follow up with your healthcare provider, or as advised, if the pain doesnt begin to improve within the next week.  If X-rays were taken, you will be notified of any new findings that may affect your care.  When to seek medical advice  Call your healthcare provider right away if any of these occur:  · Pain not improving with the above treatment  · Fingers or hand become cold, blue, numb, or tingly  · Your whole arm becomes swollen or weak  Date Last Reviewed: 11/23/2015 © 2000-2017 [employer]. [address] [place] [id number]. All rights reserved. This information is not intended as a substitute for professional medical care. Always follow your healthcare professional's instructions.

## 2019-01-07 NOTE — PROGRESS NOTES
"Subjective:       Patient ID: Jak Cody is a 43 y.o. female.    Chief Complaint: Hand Pain (numbness and tingle )    Patient comes for evaluation of right hand and wrist pain. She does not recall any trauma. This has been going on for weeks to months. The pain seems to be starting from the wrist joint" to all the fingers. It also goes to the elbow. Thus far she has not been formally diagnosed with arthritis or any autoimmune disorder. She states that all her fingers are numb. She has not been diagnosed with peripheral vascular disease. She does smoke cigarettes.      Hand Pain    The incident occurred 12 to 24 hours ago. There was no injury mechanism. The pain is present in the right wrist. The quality of the pain is described as burning, shooting and aching. Associated symptoms include numbness. Pertinent negatives include no chest pain. The treatment provided no relief.       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, controlled     Hypovitaminosis D 3/28/2018    As per Neurlogy notes at [facility] -Dr Rochelle Bennett MD neurology started on Vitamin D 50,000    Seizures      Social History     Socioeconomic History    Marital status: Single     Spouse name: Not on file    Number of children: Not on file    Years of education: Not on file    Highest education level: Not on file   Social Needs    Financial resource strain: Not on file    Food insecurity - worry: Not on file    Food insecurity - inability: Not on file    Transportation needs - medical: Not on file    Transportation needs - non-medical: Not on file   Occupational History    Occupation: disabled   Tobacco Use    Smoking status: Current Every Day Smoker     Packs/day: 0.50     Types: Cigarettes    Smokeless tobacco: Never Used   Substance and Sexual Activity    Alcohol use: No    Drug use: No " "   Sexual activity: Not on file   Other Topics Concern    Not on file   Social History Narrative    Not on file     Past Surgical History:   Procedure Laterality Date    FRACTURE SURGERY  2000    left femur     Family History   Problem Relation Age of Onset    Arthritis Mother     Asthma Mother     COPD Mother     Depression Mother     Diabetes Mother     Headaches Mother     Cancer Maternal Grandfather     Heart disease Paternal Grandfather     Heart attack Paternal Grandfather        Review of Systems   Constitutional: Positive for activity change. Negative for chills and diaphoresis.   HENT: Negative for congestion and mouth sores.    Respiratory: Negative for choking and chest tightness.    Cardiovascular: Negative for chest pain and leg swelling.   Gastrointestinal: Negative for blood in stool, diarrhea, nausea and vomiting.   Musculoskeletal: Positive for arthralgias.        Pain, pain ,pain   Skin: Negative for color change, pallor and rash.   Neurological: Positive for numbness.         Objective:      Blood pressure 124/83, pulse (!) 118, height 5' 4" (1.626 m), weight 98.9 kg (218 lb). Body mass index is 37.42 kg/m².  Physical Exam   Constitutional: She appears well-developed and well-nourished.   HENT:   Head: Normocephalic.   Mouth/Throat: Abnormal dentition (poor hygiene). Posterior oropharyngeal erythema present. No oropharyngeal exudate.   Eyes: Pupils are equal, round, and reactive to light. Right eye exhibits no discharge. No scleral icterus.   Neck: No thyromegaly present.   Cardiovascular: Normal rate and regular rhythm.   Pulmonary/Chest: No respiratory distress. She has no wheezes. She has no rales.   Abdominal: She exhibits no distension. There is no tenderness.   Musculoskeletal: She exhibits no tenderness.        Right hand: She exhibits normal capillary refill. Decreased sensation is not present in the ulnar distribution and is not present in the medial distribution. She " exhibits no finger abduction and no wrist extension trouble.   Range of motion of right wrist joint is slightly painful. I do not see any obvious swelling. On extremes of flexion and extension do she does experience some pain. She describes as numbness in all the fingers.   Neurological: She is alert.   Skin: No erythema.         Assessment:       1. Right wrist pain    2. Carpal tunnel syndrome of right wrist           No visits with results within 3 Month(s) from this visit.   Latest known visit with results is:   No results found for any previous visit.         Plan:           Right wrist pain  -     WRIST BRACE FOR HOME USE  -     X-Ray Wrist Complete Right; Future; Expected date: 01/07/2019  -     Rheumatoid factor; Future; Expected date: 01/07/2019  -     Sedimentation rate; Future; Expected date: 01/07/2019  -     CBC auto differential; Future; Expected date: 01/07/2019  -     meloxicam (MOBIC) 15 MG tablet; Take 1 tablet (15 mg total) by mouth once daily. Take with meals  Dispense: 15 tablet; Refill: 0  -     predniSONE (DELTASONE) 20 MG tablet; Take 1 tablet (20 mg total) by mouth once daily. for 5 days  Dispense: 5 tablet; Refill: 0    Carpal tunnel syndrome of right wrist      Patient has right wrist pain with the possibility of carpal tunnel syndrome. However she has numbness in all the fingers.    Later on she changed her statement and she stated that her numbness is in the thumb and the second third and fourth finger after she saw the picture of carpal tunnel nerve distribution of numbness.    Hence with the conflicting reporting symptoms it's difficult to make an appropriate diagnosis.    At this point I will treat her with a wrist splint, anti-inflammatory medication and prednisone for a few days.    Check rheumatoid factor, sedimentation rate and CBC. Check wrist x-ray for any occult fracture. May need orthopedic evaluation.    Carpal tunnel syndrome remains a presumptive diagnosis at this  point.    Pt seen with Chaperone      Current Outpatient Medications:     albuterol (VENTOLIN HFA) 90 mcg/actuation inhaler, Inhale 1 puff into the lungs every 4 to 6 hours as needed for Wheezing. Rescue, Disp: 18 g, Rfl: 5    ergocalciferol (ERGOCALCIFEROL) 50,000 unit Cap, Take 1 capsule (50,000 Units total) by mouth every 7 days., Disp: 1 capsule, Rfl: 11    omeprazole (PRILOSEC) 20 MG capsule, Take 1 capsule (20 mg total) by mouth once daily., Disp: 30 capsule, Rfl: 5    ondansetron (ZOFRAN-ODT) 4 MG TbDL, Take 1 tablet (4 mg total) by mouth every 6 (six) hours as needed., Disp: 30 tablet, Rfl: 2    phenytoin (DILANTIN) 100 MG ER capsule, Take 3 capsules (300 mg total) by mouth once daily., Disp: 90 capsule, Rfl: 2    meloxicam (MOBIC) 15 MG tablet, Take 1 tablet (15 mg total) by mouth once daily. Take with meals, Disp: 15 tablet, Rfl: 0    predniSONE (DELTASONE) 20 MG tablet, Take 1 tablet (20 mg total) by mouth once daily. for 5 days, Disp: 5 tablet, Rfl: 0

## 2019-01-08 ENCOUNTER — TELEPHONE (OUTPATIENT)
Dept: FAMILY MEDICINE | Facility: CLINIC | Age: 44
End: 2019-01-08

## 2019-01-08 LAB
BASOPHILS NFR BLD: 0 K/UL (ref 0–0.2)
BASOPHILS NFR BLD: 0.4 %
EOSINOPHIL NFR BLD: 0.4 K/UL (ref 0–0.7)
EOSINOPHIL NFR BLD: 4.5 %
ERYTHROCYTE [DISTWIDTH] IN BLOOD BY AUTOMATED COUNT: 13.5 % (ref 11.7–14.9)
GRAN #: 5 K/UL (ref 1.4–6.5)
GRAN%: 63.6 %
HCT VFR BLD AUTO: 43.2 % (ref 36–48)
HGB BLD-MCNC: 13.8 G/DL (ref 12–15)
IMMATURE GRANS (ABS): 0 K/UL (ref 0–1)
IMMATURE GRANULOCYTES: 0.3 %
LYMPH #: 1.9 K/UL (ref 1.2–3.4)
LYMPH%: 24.2 %
MCH RBC QN AUTO: 29.7 PG (ref 25–35)
MCHC RBC AUTO-ENTMCNC: 31.9 G/DL (ref 31–36)
MCV RBC AUTO: 92.9 FL (ref 79–98)
MONO #: 0.6 K/UL (ref 0.1–0.6)
MONO%: 7 %
NUCLEATED RBCS: 0 %
PLATELET # BLD AUTO: 315 K/UL (ref 140–440)
PMV BLD AUTO: 9.7 FL (ref 8.8–12.7)
RBC # BLD AUTO: 4.65 M/UL (ref 3.5–5.5)
WBC # BLD AUTO: 7.8 K/UL (ref 5–10)

## 2019-01-08 NOTE — TELEPHONE ENCOUNTER
----- Message from Reymundo Serra MD sent at 1/8/2019 11:55 AM CST -----  Notify patient that her blood counts are okay. Test for rheumatoid factor and other tests are still pending. Wrist x-ray was okay.

## 2019-01-08 NOTE — TELEPHONE ENCOUNTER
----- Message from Reymundo Serra MD sent at 1/8/2019 11:54 AM CST -----  Notify patient that her chest x-ray was okay. No bony swelling or evidence of significant arthritis. No fractures or dislocations. She needs to continue using the splint for 6 weeks.

## 2019-01-08 NOTE — PROGRESS NOTES
Notify patient that her blood counts are okay. Test for rheumatoid factor and other tests are still pending. Wrist x-ray was okay.

## 2019-01-08 NOTE — PROGRESS NOTES
Notify patient that her chest x-ray was okay. No bony swelling or evidence of significant arthritis. No fractures or dislocations. She needs to continue using the splint for 6 weeks.

## 2019-01-10 ENCOUNTER — TELEPHONE (OUTPATIENT)
Dept: FAMILY MEDICINE | Facility: CLINIC | Age: 44
End: 2019-01-10

## 2019-01-10 LAB — RHEUMATOID FACT SERPL-ACNC: 69.3 IU/ML (ref 0–13.9)

## 2019-01-14 ENCOUNTER — OFFICE VISIT (OUTPATIENT)
Dept: FAMILY MEDICINE | Facility: CLINIC | Age: 44
End: 2019-01-14
Payer: MEDICAID

## 2019-01-14 VITALS
WEIGHT: 218 LBS | HEIGHT: 64 IN | BODY MASS INDEX: 37.22 KG/M2 | DIASTOLIC BLOOD PRESSURE: 88 MMHG | HEART RATE: 85 BPM | SYSTOLIC BLOOD PRESSURE: 135 MMHG

## 2019-01-14 DIAGNOSIS — M05.731 RHEUMATOID ARTHRITIS INVOLVING RIGHT WRIST WITH POSITIVE RHEUMATOID FACTOR: ICD-10-CM

## 2019-01-14 DIAGNOSIS — M25.531 RIGHT WRIST PAIN: Primary | ICD-10-CM

## 2019-01-14 PROCEDURE — 99213 OFFICE O/P EST LOW 20 MIN: CPT | Mod: ,,, | Performed by: INTERNAL MEDICINE

## 2019-01-14 PROCEDURE — 99213 PR OFFICE/OUTPT VISIT, EST, LEVL III, 20-29 MIN: ICD-10-PCS | Mod: ,,, | Performed by: INTERNAL MEDICINE

## 2019-01-14 NOTE — PROGRESS NOTES
Subjective:       Patient ID: Jak Cody is a 43 y.o. female.    Chief Complaint: Arthritis (lab review ) and Hand Pain    Ms. Jak Cody is a 43-year-old  female who comes for follow-up. This is a follow-up on previous visit recently for right wrist and hand pain which is moderately significant. She was initiated on meloxicam which she has really not filled does now. She was also advised to use a wrist splint which she has not done so far. I done some initial investigations including x-ray and rheumatoid factor. While the x-ray is normal, the rheumatoid factor is significantly positive. She does recall that her grandmother was diagnosed with rheumatoid arthritis. No other family member she recalls the diagnosis of rheumatoid arthritis.    While, during the last visit she had indicated that the significant pain is only restricted to the right wrist and feeling of numbness in the fingers, today she extrapolates it further by stating that all her body hurts.      Arthritis   Presents for follow-up visit. She complains of pain and stiffness. The symptoms have been stable. Associated symptoms include fatigue and pain while resting. Pertinent negatives include no diarrhea, rash or Raynaud's syndrome.       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, controlled     Hypovitaminosis D 3/28/2018    As per Neurlogy notes at [facility] -Dr Rochelle Bennett MD neurology started on Vitamin D 50,000    Seizures      Social History     Socioeconomic History    Marital status: Single     Spouse name: Not on file    Number of children: Not on file    Years of education: Not on file    Highest education level: Not on file   Social Needs    Financial resource strain: Not on file    Food insecurity - worry: Not on file    Food insecurity - inability: Not on file     "Transportation needs - medical: Not on file    Transportation needs - non-medical: Not on file   Occupational History    Occupation: disabled   Tobacco Use    Smoking status: Current Every Day Smoker     Packs/day: 0.50     Types: Cigarettes    Smokeless tobacco: Never Used   Substance and Sexual Activity    Alcohol use: No    Drug use: No    Sexual activity: Not on file   Other Topics Concern    Not on file   Social History Narrative    Not on file     Past Surgical History:   Procedure Laterality Date    FRACTURE SURGERY  2000    left femur     Family History   Problem Relation Age of Onset    Arthritis Mother     Asthma Mother     COPD Mother     Depression Mother     Diabetes Mother     Headaches Mother     Cancer Maternal Grandfather     Heart disease Paternal Grandfather     Heart attack Paternal Grandfather        Review of Systems   Constitutional: Positive for activity change and fatigue. Negative for chills and diaphoresis.   HENT: Negative for congestion and mouth sores.    Eyes: Positive for itching. Negative for discharge.   Respiratory: Negative for choking and chest tightness.    Cardiovascular: Negative for chest pain and leg swelling.   Gastrointestinal: Negative for blood in stool, diarrhea, nausea and vomiting.   Endocrine: Negative for cold intolerance and heat intolerance.   Genitourinary: Negative for difficulty urinating.   Musculoskeletal: Positive for arthralgias, arthritis, myalgias, neck pain and stiffness.        Pain, pain ,pain- right factor was positive she states that all her body hurts. After low she was stating that her only wrist and hand hurts.   Skin: Negative for color change, pallor and rash.   Neurological: Positive for numbness.   Hematological: Negative for adenopathy. Does not bruise/bleed easily.   Psychiatric/Behavioral: Positive for dysphoric mood.         Objective:      Blood pressure 135/88, pulse 85, height 5' 4" (1.626 m), weight 98.9 kg (218 lb), " last menstrual period 01/14/2019. Body mass index is 37.42 kg/m².  Physical Exam   Constitutional: She is oriented to person, place, and time. She appears well-developed. She is cooperative. No distress.   BMI is 37.   HENT:   Head: Normocephalic and atraumatic.   Right Ear: Tympanic membrane normal.   Left Ear: Tympanic membrane normal.   Eyes: Conjunctivae, EOM and lids are normal. Pupils are equal, round, and reactive to light. Lids are everted and swept, no foreign bodies found. Right pupil is round and reactive. Left pupil is round and reactive.   Neck: Trachea normal and normal range of motion. Neck supple.   Cardiovascular: Normal rate, regular rhythm, S1 normal, S2 normal and normal heart sounds.   Pulmonary/Chest: Breath sounds normal.   Abdominal: Soft. Bowel sounds are normal. There is no rigidity and no guarding.   Musculoskeletal:        Right hand: She exhibits tenderness. She exhibits no deformity. Normal sensation noted.        Left hand: She exhibits no tenderness and no deformity.   Patient does have subjective complains of proximal interphalangeal and distal interphalangeal pains and stiffness. Range of motion of all the interphalangeal joints is complete. Range of motion of wrist joint is complete. There is no significant discomfort on compression of the metacarpal bones. There is no deformity noticed at this point. There is some discoloration of the hands and fingers with pinkish and purplish color.   Lymphadenopathy:     She has no cervical adenopathy.     She has no axillary adenopathy.   Neurological: She is alert and oriented to person, place, and time.   Skin: Skin is warm and dry. Capillary refill takes less than 2 seconds.   Psychiatric: She has a normal mood and affect. Her behavior is normal. Judgment and thought content normal.   Nursing note and vitals reviewed.        Assessment:       1. Right wrist pain    2. Rheumatoid arthritis involving right wrist with positive rheumatoid  factor           Office Visit on 01/07/2019   Component Date Value Ref Range Status    Rheumatoid Factor 01/08/2019 69.3* 0.0 - 13.9 IU/mL Final    WBC 01/08/2019 7.8  5.0 - 10.0 K/uL Final    RBC 01/08/2019 4.65  3.50 - 5.50 M/uL Final    Hemoglobin 01/08/2019 13.8  12.0 - 15.0 g/dL Final    Hematocrit 01/08/2019 43.2  36.0 - 48.0 % Final    MCV 01/08/2019 92.9  79.0 - 98.0 fL Final    MCH 01/08/2019 29.7  25.0 - 35.0 pg Final    MCHC 01/08/2019 31.9  31.0 - 36.0 g/dL Final    RDW 01/08/2019 13.5  11.7 - 14.9 % Final    Platelets 01/08/2019 315  140 - 440 K/uL Final    MPV 01/08/2019 9.7  8.8 - 12.7 fL Final    Gran% 01/08/2019 63.6  % Final    Lymph% 01/08/2019 24.2  % Final    Mono% 01/08/2019 7.0  % Final    Eosinophil% 01/08/2019 4.5  % Final    Basophil% 01/08/2019 0.4  % Final    Gran # (ANC) 01/08/2019 5.0  1.4 - 6.5 K/uL Final    Lymph # 01/08/2019 1.9  1.2 - 3.4 K/uL Final    Mono # 01/08/2019 0.6  0.1 - 0.6 K/uL Final    Eos # 01/08/2019 0.4  0.0 - 0.7 K/uL Final    Baso # 01/08/2019 0.0  0.0 - 0.2 K/uL Final    Immature Grans (Abs) 01/08/2019 0.0  0.0 - 1.0 K/uL Final    Immature Granulocytes 01/08/2019 0.3  % Final    nRBC% 01/08/2019 0  % Final         Plan:           Right wrist pain  -     Cyclic citrul peptide antibody, IgG; Future; Expected date: 01/14/2019    Rheumatoid arthritis involving right wrist with positive rheumatoid factor  -     Cyclic citrul peptide antibody, IgG; Future; Expected date: 01/14/2019      Strongly positive rheumatoid factor in this lady who initially presented with right wrist and right hand pain and now complains of generalized aches and pains.    There seems to be some positive family history of rheumatoid arthritis in grandmother.    Patient's incidental use of tobacco and marijuana has been noted. She is unlikely to quit anytime soon.    I will check a CCP antibody for confirmation but will consider starting her on combination of methotrexate  and folic acid soon. We may consider Plaquenil also. In case she needs biologicals, may make referral to rheumatology.      Follow-up in 6 weeks' time.    Me initially started on methotrexate/folic acid only. Advised to check online information.    Pt seen with Chaperone.       Current Outpatient Medications:     albuterol (VENTOLIN HFA) 90 mcg/actuation inhaler, Inhale 1 puff into the lungs every 4 to 6 hours as needed for Wheezing. Rescue, Disp: 18 g, Rfl: 5    ergocalciferol (ERGOCALCIFEROL) 50,000 unit Cap, Take 1 capsule (50,000 Units total) by mouth every 7 days., Disp: 1 capsule, Rfl: 11    meloxicam (MOBIC) 15 MG tablet, Take 1 tablet (15 mg total) by mouth once daily. Take with meals, Disp: 15 tablet, Rfl: 0    omeprazole (PRILOSEC) 20 MG capsule, Take 1 capsule (20 mg total) by mouth once daily., Disp: 30 capsule, Rfl: 5    ondansetron (ZOFRAN-ODT) 4 MG TbDL, Take 1 tablet (4 mg total) by mouth every 6 (six) hours as needed., Disp: 30 tablet, Rfl: 2    phenytoin (DILANTIN) 100 MG ER capsule, Take 3 capsules (300 mg total) by mouth once daily., Disp: 90 capsule, Rfl: 2

## 2019-01-14 NOTE — PATIENT INSTRUCTIONS
What is Arthritis?  Arthritis is a disease that affects the joints (the parts where bones meet and move). It can affect any joint in your body. There are many types of arthritis, including osteoarthritis and rheumatoid arthrtitis. If your symptoms are mild, medications may be enough to reduce pain and swelling. For more severe arthritis, surgery may be needed to improve the condition of the joint or replace the joint entirely.                  What causes arthritis?  Cartilage is a smooth substance that protects the ends of your bones and provides cushioning. When you have arthritis, this cartilage breaks down and can no longer protect your bones. The bones rub against each other, causing pain and swelling. Over time, bone spurs (small pieces of rough or splintered bone) may develop, and the joint's range of motion can become limited.  Symptoms  Some of the more common symptoms of arthritis include:  · Joint pain and stiffness. Pain and stiffness get worse with long periods of rest or using a joint too long or too hard.  · Joints that have lost normal shape and motion.  · Tender, inflamed joints. They may look red and feel warm.  · Grinding or popping noise with joint movement.   · Feeling tired all the time.  Reducing symptoms  Following a healthy lifestyle by losing weight and exercising can help reduce symptoms of osteoarthritis. Medicines can be very helpful for arthritis.     Date Last Reviewed: 9/10/2015  © 0377-4953 [employer]. [address] [place] [address], [place] [address]. All rights reserved. This information is not intended as a substitute for professional medical care. Always follow your healthcare professional's instructions.        What is Rheumatoid Arthritis?  Rheumatoid arthritis (RA) is a disease that affects the synovium, the lining of the joints. This causes pain, swelling, and stiffness. Left untreated, rheumatoid arthritis may damage joints so badly that they no longer function.  This disease appears most often in young-adult to middle-age women.      Rheumatoid arthritis affects the lining (synovium) of the joints, sometimes causing swelling.   What causes RA?  RA is an autoimmune disorder. This means the immune system, which normally protects the body, actually causes harm. In RA, the immune system attacks the joint lining. The reason for this is unknown. Researchers believe it is a combination of genes (what is inherited from parents) and environment (for example, having infections from viruses or bacteria). Also, people who smoke or are overweight have an increased risk of developing RA.  What are the symptoms of RA?  Rheumatoid arthritis can affect most joints. The hands, wrists, elbows, knees, and balls of the feet are common sites. This disease often affects the same joint on both sides of the body. Symptoms may include:  · Tender, swollen inflamed joints. They may also look red and feel warm.  · Stiff joints. Long periods of rest or using a joint too long or too hard can make stiffness worse.  · Joints that have lost normal shape and motion.  · Feeling tired.  How is RA diagnosed?  To diagnose rheumatoid arthritis, your healthcare provider will ask you a lot of questions about your medical history and current symptoms. He or she will examine you, paying close attention to your joints. You will also have blood tests and X-rays. Your provider will likely recommend that you see a rheumatologist, an arthritis specialist.  Date Last Reviewed: 2/14/2016  © 1738-5023 [employer]. [address] [place] [address], [place] [address]. All rights reserved. This information is not intended as a substitute for professional medical care. Always follow your healthcare professional's instructions.

## 2019-02-26 LAB — CCP ANTIBODIES IGG/IGA: >250 UNITS (ref 0–19)

## 2019-02-27 NOTE — PROGRESS NOTES
If patient misses appointment tomorrow, she needs a follow-up for abnormal rheumatoid arthritis testing.

## 2019-03-01 DIAGNOSIS — J44.9 CHRONIC OBSTRUCTIVE PULMONARY DISEASE, UNSPECIFIED COPD TYPE: ICD-10-CM

## 2019-03-01 RX ORDER — ALBUTEROL SULFATE 90 UG/1
AEROSOL, METERED RESPIRATORY (INHALATION)
Qty: 18 EACH | Refills: 5 | Status: SHIPPED | OUTPATIENT
Start: 2019-03-01

## 2019-03-14 ENCOUNTER — OFFICE VISIT (OUTPATIENT)
Dept: FAMILY MEDICINE | Facility: CLINIC | Age: 44
End: 2019-03-14
Payer: MEDICAID

## 2019-03-14 VITALS
DIASTOLIC BLOOD PRESSURE: 70 MMHG | HEART RATE: 65 BPM | BODY MASS INDEX: 36.7 KG/M2 | HEIGHT: 64 IN | WEIGHT: 215 LBS | SYSTOLIC BLOOD PRESSURE: 112 MMHG

## 2019-03-14 DIAGNOSIS — Z53.21 PATIENT LEFT WITHOUT BEING SEEN: Primary | ICD-10-CM

## 2019-03-14 PROCEDURE — 99499 UNLISTED E&M SERVICE: CPT | Mod: ,,, | Performed by: INTERNAL MEDICINE

## 2019-03-14 PROCEDURE — 99499 NO LOS: ICD-10-PCS | Mod: ,,, | Performed by: INTERNAL MEDICINE

## 2019-03-16 PROBLEM — Z53.21 PATIENT LEFT WITHOUT BEING SEEN: Status: ACTIVE | Noted: 2019-03-16

## 2019-03-28 ENCOUNTER — OFFICE VISIT (OUTPATIENT)
Dept: FAMILY MEDICINE | Facility: CLINIC | Age: 44
End: 2019-03-28
Payer: MEDICAID

## 2019-03-28 VITALS
DIASTOLIC BLOOD PRESSURE: 77 MMHG | HEIGHT: 64 IN | SYSTOLIC BLOOD PRESSURE: 115 MMHG | BODY MASS INDEX: 37.05 KG/M2 | HEART RATE: 82 BPM | WEIGHT: 217 LBS

## 2019-03-28 DIAGNOSIS — M05.731 RHEUMATOID ARTHRITIS INVOLVING RIGHT WRIST WITH POSITIVE RHEUMATOID FACTOR: Primary | ICD-10-CM

## 2019-03-28 PROCEDURE — 99214 PR OFFICE/OUTPT VISIT, EST, LEVL IV, 30-39 MIN: ICD-10-PCS | Mod: ,,, | Performed by: INTERNAL MEDICINE

## 2019-03-28 PROCEDURE — 99214 OFFICE O/P EST MOD 30 MIN: CPT | Mod: ,,, | Performed by: INTERNAL MEDICINE

## 2019-03-28 RX ORDER — METHOTREXATE 2.5 MG/1
7.5 TABLET ORAL
Qty: 12 TABLET | Refills: 2 | Status: SHIPPED | OUTPATIENT
Start: 2019-03-28 | End: 2019-05-16

## 2019-03-28 RX ORDER — FOLIC ACID 1 MG/1
1 TABLET ORAL DAILY
Qty: 30 TABLET | Refills: 2 | Status: SHIPPED | OUTPATIENT
Start: 2019-03-28 | End: 2020-03-27

## 2019-03-28 NOTE — PATIENT INSTRUCTIONS
What is Rheumatoid Arthritis?  Rheumatoid arthritis (RA) is a disease that affects the synovium, the lining of the joints. This causes pain, swelling, and stiffness. Left untreated, rheumatoid arthritis may damage joints so badly that they no longer function. This disease appears most often in young-adult to middle-age women.      Rheumatoid arthritis affects the lining (synovium) of the joints, sometimes causing swelling.   What causes RA?  RA is an autoimmune disorder. This means the immune system, which normally protects the body, actually causes harm. In RA, the immune system attacks the joint lining. The reason for this is unknown. Researchers believe it is a combination of genes (what is inherited from parents) and environment (for example, having infections from viruses or bacteria). Also, people who smoke or are overweight have an increased risk of developing RA.  What are the symptoms of RA?  Rheumatoid arthritis can affect most joints. The hands, wrists, elbows, knees, and balls of the feet are common sites. This disease often affects the same joint on both sides of the body. Symptoms may include:  · Tender, swollen inflamed joints. They may also look red and feel warm.  · Stiff joints. Long periods of rest or using a joint too long or too hard can make stiffness worse.  · Joints that have lost normal shape and motion.  · Feeling tired.  How is RA diagnosed?  To diagnose rheumatoid arthritis, your healthcare provider will ask you a lot of questions about your medical history and current symptoms. He or she will examine you, paying close attention to your joints. You will also have blood tests and X-rays. Your provider will likely recommend that you see a rheumatologist, an arthritis specialist.  Date Last Reviewed: 2/14/2016 © 2000-2017 [employer]. [address] [place] [address], [place] [address]. All rights reserved. This information is not intended as a substitute for professional medical  care. Always follow your healthcare professional's instructions.        Living with Rheumatoid Arthritis    Rheumatoid arthritis (RA) is a chronic disease, but it doesn't have to keep you from being active. It is an autoimmune disease and your immune system attacks the lining of your joints. You can help control RA with exercise and a healthy lifestyle. Be sure to see your healthcare provider for scheduled checkups and lab work. At some point, you may be referred to a rheumatologist (a healthcare provider who specializes in arthritis and related diseases).  Make exercise part of your life  Gentle exercise can help lessen your pain. Keep the following in mind:  · Choose exercises that improve joint motion and make your muscles stronger. Your healthcare provider or a physical therapist may suggest a few.  · Most people should exercise for at least 30 minutes a day on most days of the week. This can be broken up into shorter periods throughout the day.  · Try walking, riding a bike, or doing exercises in a warm pool. Look for programs in your community for people with arthritis.   · Dont push yourself too hard at first. Slowly build up over time.  · Make sure you warm up for 5 to 10 minutes each time you exercise. Stretching and flexibility exercises are often helpful.   · If pain and stiffness increase, don't exercise as hard or as long.  Watch your weight  If you weigh more than you should, your weight-bearing joints are under extra pressure. This makes your symptoms worse. To reduce pain and stiffness, try shedding a few of those extra pounds. The tips below may help:  · Start a weight-loss program with the help of your healthcare provider.  · Ask your friends and family for support.  · Join a weight-loss group.  Learn ways to cope  Most people with long-term conditions find it a challenge to deal with the emotions that often go along with their conditions. With rheumatoid arthritis, there is also pain.   · Work  with your healthcare provider on ways to lessen pain. Medicines, use of heat and cold, and other methods are available.  · Learn to relax. Although it may not be easy, it does help lessen stress, anxiety, and pain. Simple deep-breathing exercises, meditation, and yoga are examples of relaxation techniques.  · Depression is common with long-term conditions. If you feel depressed, make sure you talk with your healthcare provider. Again, treatments, like medicine and counseling, are available.  Try to make your day easier  There are things you can do every day to protect your joints:  · Learn to balance rest with activity. Even on days when you have few symptoms, rest is still important.  · Ask friends and family members for help. Help with simple things can make a big difference for you. For example, you might ask someone to change a light bulb, or take out your weekly garbage.  · Use assistive devices, which are special tools that reduce strain and protect joints. For example:  ¨ Long-handled reachers or grabbers for reaching high and low  ¨ Jar-openers, two-handled cups, and button --all of these devices help to protect your fingers, hands, and wrists  ¨ Large  for pencils, pens, kitchen and garden tools  The Arthritis Foundation has many additional suggestions about protecting your joints. Go to their website at http://www.arthritis.org.  Use mobility and other aids  People with arthritis and other problems affecting the joints often use mobility aids, to help with walking. For example, they may use canes or walkers. They may also use splints or braces to support joints. Talk with your healthcare provider or therapist about these aids. For instance, you might benefit from:  · Use a cane to ease knee or hip pain and help prevent falls  · Splints for your wrists or other joints  · A brace to support a weak knee joint  Date Last Reviewed: 2/14/2016  © 2117-2270 [employer]. [address] [place] [address]  Road, [place] [address]. All rights reserved. This information is not intended as a substitute for professional medical care. Always follow your healthcare professional's instructions.

## 2019-03-28 NOTE — PROGRESS NOTES
"Subjective:       Patient ID: Jak Cody is a 43 y.o. female.    Chief Complaint: Arthritis and Pain (bilateral)    Ms Benedict comes for follow up on discussion and further Tx for newly discovered Rheumatoid arthritis.R Factor and CCP antibodies were all strongly positive.   Pt recall mother was " diagnosed with Arthritis at old age with significant crippling."    Pt states taht she is upset and life should not be like this. "I need to be pain free."    She no showed and walked out a few appts in past and on one occ since I was a little late.    "My time is valuable" " Diogo busy - I have lot to attend to"    Arthritis   Presents for follow-up visit. She complains of pain and stiffness. The symptoms have been worsening. Affected locations include the left MCP, right PIP, left PIP, right DIP, right MCP, left wrist, right wrist, left knee and right knee. Associated symptoms include fatigue. Pertinent negatives include no diarrhea, dysuria or rash. Compliance problems include psychosocial issues.    Pain   This is a chronic problem. The current episode started more than 1 year ago. The problem occurs constantly. The problem has been unchanged. Associated symptoms include arthralgias, fatigue, myalgias, neck pain and numbness. Pertinent negatives include no abdominal pain, chest pain, chills, congestion, diaphoresis, nausea, rash or vomiting. The symptoms are aggravated by walking and twisting. She has tried acetaminophen and NSAIDs for the symptoms. The treatment provided no relief.       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, controlled     Hypovitaminosis D 3/28/2018    As per Neurlogy notes at [facility] -Dr Rochelle Bennett MD neurology started on Vitamin D 50,000    Seizures      Social History     Socioeconomic History    Marital status: Single     Spouse name: Not on " file    Number of children: Not on file    Years of education: Not on file    Highest education level: Not on file   Occupational History    Occupation: disabled   Social Needs    Financial resource strain: Not on file    Food insecurity:     Worry: Not on file     Inability: Not on file    Transportation needs:     Medical: Not on file     Non-medical: Not on file   Tobacco Use    Smoking status: Current Every Day Smoker     Packs/day: 0.50     Types: Cigarettes    Smokeless tobacco: Never Used   Substance and Sexual Activity    Alcohol use: No    Drug use: No    Sexual activity: Not on file   Lifestyle    Physical activity:     Days per week: Not on file     Minutes per session: Not on file    Stress: Not on file   Relationships    Social connections:     Talks on phone: Not on file     Gets together: Not on file     Attends [employer] service: Not on file     Active member of club or organization: Not on file     Attends meetings of clubs or organizations: Not on file     Relationship status: Not on file    Intimate partner violence:     Fear of current or ex partner: Not on file     Emotionally abused: Not on file     Physically abused: Not on file     Forced sexual activity: Not on file   Other Topics Concern    Not on file   Social History Narrative    Not on file     Past Surgical History:   Procedure Laterality Date    FRACTURE SURGERY  2000    left femur     Family History   Problem Relation Age of Onset    Arthritis Mother     Asthma Mother     COPD Mother     Depression Mother     Diabetes Mother     Headaches Mother     Cancer Maternal Grandfather     Heart disease Paternal Grandfather     Heart attack Paternal Grandfather        Review of Systems   Constitutional: Positive for activity change and fatigue. Negative for chills and diaphoresis.   HENT: Negative for congestion and mouth sores.    Eyes: Positive for itching. Negative for discharge.   Respiratory: Negative for  "choking, chest tightness and stridor.    Cardiovascular: Negative for chest pain and leg swelling.   Gastrointestinal: Negative for abdominal distention, abdominal pain, blood in stool, diarrhea, nausea and vomiting.   Endocrine: Negative for cold intolerance, heat intolerance and polydipsia.   Genitourinary: Negative for difficulty urinating, dysuria and flank pain.   Musculoskeletal: Positive for arthralgias, arthritis, myalgias, neck pain and stiffness.        Pain, pain ,pain- right factor was positive she states that all her body hurts. After low she was stating that her only wrist and hand hurts.   Skin: Negative for color change, pallor and rash.   Neurological: Positive for numbness. Negative for syncope, facial asymmetry and speech difficulty.   Hematological: Negative for adenopathy. Does not bruise/bleed easily.   Psychiatric/Behavioral: Positive for dysphoric mood.         Objective:      Blood pressure 115/77, pulse 82, height 5' 4" (1.626 m), weight 98.4 kg (217 lb). Body mass index is 37.25 kg/m².  Physical Exam   Constitutional: She is oriented to person, place, and time. She appears well-developed. She is cooperative. No distress.   BMI is 37.   HENT:   Head: Normocephalic and atraumatic.   Right Ear: Tympanic membrane normal.   Left Ear: Tympanic membrane normal.   Eyes: Pupils are equal, round, and reactive to light. Conjunctivae, EOM and lids are normal. Lids are everted and swept, no foreign bodies found. Right pupil is round and reactive. Left pupil is round and reactive.   Neck: Trachea normal and normal range of motion. Neck supple.   Cardiovascular: Normal rate, regular rhythm, S1 normal, S2 normal and normal heart sounds.   Pulmonary/Chest: Breath sounds normal.   Abdominal: Soft. Bowel sounds are normal. There is no rigidity and no guarding.   Musculoskeletal:        Right hand: She exhibits tenderness. She exhibits no deformity. Normal sensation noted.        Left hand: She exhibits no " tenderness and no deformity.   Patient does have subjective complains of proximal interphalangeal and distal interphalangeal pains and stiffness. Range of motion of all the interphalangeal joints is complete. Range of motion of wrist joint is complete. There is no significant discomfort on compression of the metacarpal bones. There is no deformity noticed at this point. There is some discoloration of the hands and fingers with pinkish and purplish color.   Lymphadenopathy:     She has no cervical adenopathy.     She has no axillary adenopathy.   Neurological: She is alert and oriented to person, place, and time.   Skin: Skin is warm and dry.   Psychiatric: Her mood appears anxious. Her affect is labile.   Dysphoric mood-intermittently loud and scathing in her remarks,    Nursing note and vitals reviewed.        Assessment:       1. Rheumatoid arthritis involving right wrist with positive rheumatoid factor           Orders Only on 02/25/2019   Component Date Value Ref Range Status    CCP Antibodies IgG/IgA 02/25/2019 >250* 0 - 19 units Final   Office Visit on 01/07/2019   Component Date Value Ref Range Status    Rheumatoid Factor 01/08/2019 69.3* 0.0 - 13.9 IU/mL Final    WBC 01/08/2019 7.8  5.0 - 10.0 K/uL Final    RBC 01/08/2019 4.65  3.50 - 5.50 M/uL Final    Hemoglobin 01/08/2019 13.8  12.0 - 15.0 g/dL Final    Hematocrit 01/08/2019 43.2  36.0 - 48.0 % Final    MCV 01/08/2019 92.9  79.0 - 98.0 fL Final    MCH 01/08/2019 29.7  25.0 - 35.0 pg Final    MCHC 01/08/2019 31.9  31.0 - 36.0 g/dL Final    RDW 01/08/2019 13.5  11.7 - 14.9 % Final    Platelets 01/08/2019 315  140 - 440 K/uL Final    MPV 01/08/2019 9.7  8.8 - 12.7 fL Final    Gran% 01/08/2019 63.6  % Final    Lymph% 01/08/2019 24.2  % Final    Mono% 01/08/2019 7.0  % Final    Eosinophil% 01/08/2019 4.5  % Final    Basophil% 01/08/2019 0.4  % Final    Gran # (ANC) 01/08/2019 5.0  1.4 - 6.5 K/uL Final    Lymph # 01/08/2019 1.9  1.2 - 3.4  "K/uL Final    Mono # 01/08/2019 0.6  0.1 - 0.6 K/uL Final    Eos # 01/08/2019 0.4  0.0 - 0.7 K/uL Final    Baso # 01/08/2019 0.0  0.0 - 0.2 K/uL Final    Immature Grans (Abs) 01/08/2019 0.0  0.0 - 1.0 K/uL Final    Immature Granulocytes 01/08/2019 0.3  % Final    nRBC% 01/08/2019 0  % Final     Moderate counseling about he medical condition and long term sequale.   Compliance, cooperation and patience utmost importance    May need o refer to Rheumatology with limited resources and availability.    Avoid confrontational and adversarial behavior with providers especially if she ids refd to specialist.  Plan:           Rheumatoid arthritis involving right wrist with positive rheumatoid factor  -     methotrexate 2.5 MG Tab; Take 3 tablets (7.5 mg total) by mouth every 7 days.  Dispense: 12 tablet; Refill: 2  -     folic acid (FOLVITE) 1 MG tablet; Take 1 tablet (1 mg total) by mouth once daily.  Dispense: 30 tablet; Refill: 2  -     CBC auto differential; Future; Expected date: 03/28/2019  -     Comprehensive metabolic panel; Future; Expected date: 03/28/2019      Start Methotrexate and Folic acid.     Goal ids to restrict inflammation and hopefully reduce pain    My inability to prescribe opiates clarified.     Quit smoking.     Spent 25 mts with > 50% time for face to face review of labs and counseling especially new meds with potential long term ramifications.    Pt seen with Ms Meagan Butt Med student.    Fup 1.5 ( 6 weeks) month      Early appt in AM in order to "avoid wastage of her time"               Current Outpatient Medications:     albuterol (PROVENTIL/VENTOLIN HFA) 90 mcg/actuation inhaler, INHALE ONE PUFF BY MOUTH INTO THE LUNGS EVERY 4 TO 6 HOURS AS NEEDED FOR WHEEZING, Disp: 18 each, Rfl: 5    ergocalciferol (ERGOCALCIFEROL) 50,000 unit Cap, Take 1 capsule (50,000 Units total) by mouth every 7 days., Disp: 1 capsule, Rfl: 11    folic acid (FOLVITE) 1 MG tablet, Take 1 tablet (1 mg total) by " mouth once daily., Disp: 30 tablet, Rfl: 2    meloxicam (MOBIC) 15 MG tablet, Take 1 tablet (15 mg total) by mouth once daily. Take with meals, Disp: 15 tablet, Rfl: 0    methotrexate 2.5 MG Tab, Take 3 tablets (7.5 mg total) by mouth every 7 days., Disp: 12 tablet, Rfl: 2    omeprazole (PRILOSEC) 20 MG capsule, Take 1 capsule (20 mg total) by mouth once daily., Disp: 30 capsule, Rfl: 5    ondansetron (ZOFRAN-ODT) 4 MG TbDL, Take 1 tablet (4 mg total) by mouth every 6 (six) hours as needed., Disp: 30 tablet, Rfl: 2    phenytoin (DILANTIN) 100 MG ER capsule, Take 3 capsules (300 mg total) by mouth once daily., Disp: 90 capsule, Rfl: 2

## 2019-05-02 LAB
ALBUMIN SERPL-MCNC: 4 G/DL (ref 3.1–4.7)
ALP SERPL-CCNC: 48 IU/L (ref 40–104)
ALT (SGPT): 13 IU/L (ref 3–33)
AST SERPL-CCNC: 13 IU/L (ref 10–40)
BILIRUB SERPL-MCNC: 0.8 MG/DL (ref 0.3–1)
BUN SERPL-MCNC: 10 MG/DL (ref 8–20)
CALCIUM SERPL-MCNC: 8.8 MG/DL (ref 7.7–10.4)
CHLORIDE: 105 MMOL/L (ref 98–110)
CO2 SERPL-SCNC: 24.8 MMOL/L (ref 22.8–31.6)
CREATININE: 0.71 MG/DL (ref 0.6–1.4)
GLUCOSE: 105 MG/DL (ref 70–99)
HCT VFR BLD AUTO: 43.2 % (ref 36–48)
HGB BLD-MCNC: 14 G/DL (ref 12–15)
MCH RBC QN AUTO: 29.9 PG (ref 25–35)
MCHC RBC AUTO-ENTMCNC: 32.4 G/DL (ref 31–36)
MCV RBC AUTO: 92.3 FL (ref 79–98)
NUCLEATED RBCS: 0 %
PLATELET # BLD AUTO: 300 K/UL (ref 140–440)
POTASSIUM SERPL-SCNC: 4.2 MMOL/L (ref 3.5–5)
PROT SERPL-MCNC: 7 G/DL (ref 6–8.2)
RBC # BLD AUTO: 4.68 M/UL (ref 3.5–5.5)
SODIUM: 136 MMOL/L (ref 134–144)
WBC # BLD AUTO: 8.1 K/UL (ref 5–10)

## 2019-05-03 ENCOUNTER — TELEPHONE (OUTPATIENT)
Dept: FAMILY MEDICINE | Facility: CLINIC | Age: 44
End: 2019-05-03

## 2019-05-03 NOTE — TELEPHONE ENCOUNTER
----- Message from Reymundo Serra MD sent at 5/2/2019  5:43 PM CDT -----  Notify patient that her labs are in acceptable range. Keep follow-up.

## 2019-05-16 ENCOUNTER — OFFICE VISIT (OUTPATIENT)
Dept: FAMILY MEDICINE | Facility: CLINIC | Age: 44
End: 2019-05-16
Payer: MEDICAID

## 2019-05-16 VITALS
HEIGHT: 64 IN | WEIGHT: 215 LBS | BODY MASS INDEX: 36.7 KG/M2 | DIASTOLIC BLOOD PRESSURE: 77 MMHG | SYSTOLIC BLOOD PRESSURE: 112 MMHG | HEART RATE: 86 BPM

## 2019-05-16 DIAGNOSIS — M05.731 RHEUMATOID ARTHRITIS INVOLVING RIGHT WRIST WITH POSITIVE RHEUMATOID FACTOR: ICD-10-CM

## 2019-05-16 DIAGNOSIS — G40.909 SEIZURE DISORDER: ICD-10-CM

## 2019-05-16 DIAGNOSIS — E78.2 MIXED DYSLIPIDEMIA: Primary | ICD-10-CM

## 2019-05-16 PROCEDURE — 99214 PR OFFICE/OUTPT VISIT, EST, LEVL IV, 30-39 MIN: ICD-10-PCS | Mod: ,,, | Performed by: INTERNAL MEDICINE

## 2019-05-16 PROCEDURE — 99214 OFFICE O/P EST MOD 30 MIN: CPT | Mod: ,,, | Performed by: INTERNAL MEDICINE

## 2019-05-16 RX ORDER — PHENYTOIN SODIUM 100 MG/1
300 CAPSULE, EXTENDED RELEASE ORAL DAILY
Qty: 90 CAPSULE | Refills: 2 | Status: SHIPPED | OUTPATIENT
Start: 2019-05-16

## 2019-05-16 NOTE — PROGRESS NOTES
Subjective:       Patient ID: Jak Cody is a 43 y.o. female.    Chief Complaint: Results (lab review ); Rheumatoid Arthritis; and Seizures    Seizures    This is a chronic problem. Episode onset: since several years. The problem has not changed since onset.Pertinent negatives include no speech difficulty, no chest pain, no nausea, no vomiting and no diarrhea. Characteristics include rhythmic jerking. recent fall   Arthritis   Presents for follow-up visit. She complains of pain, stiffness, joint swelling and joint warmth. The symptoms have been stable. Associated symptoms include fatigue. Pertinent negatives include no diarrhea, dysuria or rash.     Please note that she is taking Dilantin 300 mg per day for seizure disorder. She'll be so running out of the medications and she is concerned that she might not be able to get a refill from her neurologist was a long waiting list at [facility].    She might not have been taking her Dilantin regularly also. She has had these seizures since childhood.    She did try methotrexate for arthritis but she felt more likely nauseated on the second dosage of relief. Has she stopped taking the medication. She continues to have arthralgias and joint pains. At this point I may consider rheumatology evaluation.  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, controlled     Hypovitaminosis D 3/28/2018    As per Neurlogy notes at [facility] -Dr Rochelle Bennett MD neurology started on Vitamin D 50,000    Seizures      Social History     Socioeconomic History    Marital status: Single     Spouse name: Not on file    Number of children: Not on file    Years of education: Not on file    Highest education level: Not on file   Occupational History    Occupation: disabled   Social Needs    Financial resource strain: Not on file    Food insecurity:      Worry: Not on file     Inability: Not on file    Transportation needs:     Medical: Not on file     Non-medical: Not on file   Tobacco Use    Smoking status: Current Every Day Smoker     Packs/day: 0.50     Types: Cigarettes    Smokeless tobacco: Never Used   Substance and Sexual Activity    Alcohol use: No    Drug use: No    Sexual activity: Not on file   Lifestyle    Physical activity:     Days per week: Not on file     Minutes per session: Not on file    Stress: Not on file   Relationships    Social connections:     Talks on phone: Not on file     Gets together: Not on file     Attends Oriental orthodox service: Not on file     Active member of club or organization: Not on file     Attends meetings of clubs or organizations: Not on file     Relationship status: Not on file   Other Topics Concern    Not on file   Social History Narrative    Not on file     Past Surgical History:   Procedure Laterality Date    FRACTURE SURGERY  2000    left femur     Family History   Problem Relation Age of Onset    Arthritis Mother     Asthma Mother     COPD Mother     Depression Mother     Diabetes Mother     Headaches Mother     Cancer Maternal Grandfather     Heart disease Paternal Grandfather     Heart attack Paternal Grandfather        Review of Systems   Constitutional: Positive for activity change and fatigue. Negative for chills and diaphoresis.   HENT: Negative for congestion and mouth sores.         Tongue bite   Eyes: Negative for discharge and itching.   Respiratory: Negative for choking, chest tightness and stridor.    Cardiovascular: Negative for chest pain and leg swelling.   Gastrointestinal: Negative for abdominal distention, abdominal pain, blood in stool, diarrhea, nausea and vomiting.   Endocrine: Negative for cold intolerance, heat intolerance and polydipsia.   Genitourinary: Negative for difficulty urinating, dysuria and flank pain.   Musculoskeletal: Positive for arthralgias, arthritis, joint  "swelling, myalgias, neck pain and stiffness.        Patient has rheumatoid arthritis based upon positive RA factor and CCP antibodies. She was given a trial of methotrexate but she felt nauseated and she did not take the medication any further.   Skin: Negative for color change, pallor and rash.   Neurological: Positive for seizures and numbness. Negative for syncope, facial asymmetry and speech difficulty.        Patient has underlying seizure disorder and she had a breakthrough seizure recently. Probably she might not have taken her Dilantin.   Hematological: Negative for adenopathy. Does not bruise/bleed easily.   Psychiatric/Behavioral: Positive for dysphoric mood.         Objective:      Blood pressure 112/77, pulse 86, height 5' 4" (1.626 m), weight 97.5 kg (215 lb). Body mass index is 36.9 kg/m².  Physical Exam   Constitutional: She is oriented to person, place, and time. She appears well-developed. She is cooperative. No distress.   BMI is 37.   HENT:   Head: Normocephalic and atraumatic.   Right Ear: Tympanic membrane normal.   Left Ear: Tympanic membrane normal.   Eyes: Pupils are equal, round, and reactive to light. Conjunctivae, EOM and lids are normal. Lids are everted and swept, no foreign bodies found. Right pupil is round and reactive. Left pupil is round and reactive.   Neck: Trachea normal and normal range of motion. Neck supple.   Cardiovascular: Normal rate, regular rhythm, S1 normal, S2 normal and normal heart sounds.   Pulmonary/Chest: Breath sounds normal.   Abdominal: Soft. Bowel sounds are normal. There is no rigidity and no guarding.   Musculoskeletal:        Right hand: She exhibits tenderness. She exhibits no deformity. Normal sensation noted.        Left hand: She exhibits no tenderness and no deformity.   Patient does have subjective complains of proximal interphalangeal and distal interphalangeal pains and stiffness. Range of motion of all the interphalangeal joints is complete. Range " of motion of wrist joint is complete. There is no significant discomfort on compression of the metacarpal bones. There is no deformity noticed at this point. There is some discoloration of the hands and fingers with pinkish and purplish color.   Lymphadenopathy:     She has no cervical adenopathy.     She has no axillary adenopathy.   Neurological: She is alert and oriented to person, place, and time.   Skin: Skin is warm and dry.   Psychiatric: Her mood appears anxious. Her affect is labile.   Dysphoric mood-intermittently loud and scathing in her remarks,    Nursing note and vitals reviewed.        Assessment:       1. Mixed dyslipidemia    2. Seizure disorder    3. Rheumatoid arthritis involving right wrist with positive rheumatoid factor           Orders Only on 05/02/2019   Component Date Value Ref Range Status    WBC 05/02/2019 8.1  5.0 - 10.0 K/uL Final    RBC 05/02/2019 4.68  3.50 - 5.50 M/uL Final    Hemoglobin 05/02/2019 14.0  12.0 - 15.0 g/dL Final    Hematocrit 05/02/2019 43.2  36.0 - 48.0 % Final    Mean Corpuscular Volume 05/02/2019 92.3  79.0 - 98.0 fL Final    Mean Corpuscular Hemoglobin 05/02/2019 29.9  25.0 - 35.0 pg Final    Mean Corpuscular Hemoglobin Conc 05/02/2019 32.4  31.0 - 36.0 g/dL Final    Platelets 05/02/2019 300  140 - 440 K/uL Final    nRBC% 05/02/2019 0  % Final    Glucose 05/02/2019 105* 70 - 99 mg/dL Final    BUN, Bld 05/02/2019 10  8 - 20 mg/dL Final    Creatinine 05/02/2019 0.71  0.60 - 1.40 mg/dL Final    Calcium 05/02/2019 8.8  7.7 - 10.4 mg/dL Final    Sodium 05/02/2019 136  134 - 144 mmol/L Final    Potassium 05/02/2019 4.2  3.5 - 5.0 mmol/L Final    Chloride 05/02/2019 105  98 - 110 mmol/L Final    CO2 05/02/2019 24.8  22.8 - 31.6 mmol/L Final    Albumin 05/02/2019 4.0  3.1 - 4.7 g/dL Final    Total Bilirubin 05/02/2019 0.8  0.3 - 1.0 mg/dL Final    Alkaline Phosphatase 05/02/2019 48  40 - 104 IU/L Final    Total Protein 05/02/2019 7.0  6.0 - 8.2 g/dL  Final    ALT (SGPT) 05/02/2019 13  3 - 33 IU/L Final    AST 05/02/2019 13  10 - 40 IU/L Final   Orders Only on 02/25/2019   Component Date Value Ref Range Status    CCP Antibodies IgG/IgA 02/25/2019 >250* 0 - 19 units Final         Plan:           Mixed dyslipidemia  -     Lipid panel; Future; Expected date: 05/16/2019    Seizure disorder  -     phenytoin (DILANTIN) 100 MG ER capsule; Take 3 capsules (300 mg total) by mouth once daily.  Dispense: 90 capsule; Refill: 2  -     Phenytoin level, total; Future; Expected date: 05/16/2019    Rheumatoid arthritis involving right wrist with positive rheumatoid factor  -     Ambulatory referral to Rheumatology      Check lipid panel. Check lipid panel. Patient has run out of her seizure medications and unfortunately she cannot get a timely appointment with her neurologist. I will refill her seizure medications for 3 months. She should not be off her seizure medications.    She could not tolerate methotrexate for rheumatoid arthritis and I will set up an appointment with Caroline Jones.     I've encouraged her to quit smoking    I have no opinion about her using of Marijuana.      Follow-up with me in 3-4 months time or earlier as needed.        Current Outpatient Medications:     albuterol (PROVENTIL/VENTOLIN HFA) 90 mcg/actuation inhaler, INHALE ONE PUFF BY MOUTH INTO THE LUNGS EVERY 4 TO 6 HOURS AS NEEDED FOR WHEEZING, Disp: 18 each, Rfl: 5    ergocalciferol (ERGOCALCIFEROL) 50,000 unit Cap, Take 1 capsule (50,000 Units total) by mouth every 7 days., Disp: 1 capsule, Rfl: 11    folic acid (FOLVITE) 1 MG tablet, Take 1 tablet (1 mg total) by mouth once daily., Disp: 30 tablet, Rfl: 2    ondansetron (ZOFRAN-ODT) 4 MG TbDL, Take 1 tablet (4 mg total) by mouth every 6 (six) hours as needed., Disp: 30 tablet, Rfl: 2    phenytoin (DILANTIN) 100 MG ER capsule, Take 3 capsules (300 mg total) by mouth once daily., Disp: 90 capsule, Rfl: 2

## 2019-05-16 NOTE — PATIENT INSTRUCTIONS
"  Facts About Dietary Fat     Olive oil is a good source of unsaturated fat.     Eating less saturated and trans fat is one of the best things you can do for your heart. Start by finding out which fats are better to use. Then always try to use as little "bad" fat as you can.  Why eat less fat?  · Cutting down on the fat you eat can lower your blood cholesterol levels. This may help prevent clogged arteries from buildup of plaque.  · A low-fat diet can help you lose excess weight. Doing so can lower your blood pressure and reduce your chances of getting diabetes.  · A low-fat diet reduces your risk for stroke and for some cancers.  Unsaturated fat is most healthy  · When you must add fat, use unsaturated fat.  · Unsaturated fats come from plants. They include olive, canola, peanut, corn, avocado, safflower, and sunflower oils.  · Liquid (squeezable) margarine is also mostly unsaturated fat.  · In moderate amounts, unsaturated fat can even be good for your heart.  Saturated fat is less healthy  · Avoid eating saturated fat because it raises your blood cholesterol levels.  · Most saturated fat comes from animals. Foods such as butter, lard, cheese, cream, whole milk, and fatty cuts of meat are high in saturated fat.  · Some oils, such as palm and coconut oils, are also saturated fats.  Trans fat is least healthy  · Also avoid trans fat whenever possible. Even if it's not listed on the food label, look for it in the ingredients in the form of hydrogenated or partially hydrogenated oils.  · This is found in snack foods, shortening, french fries, and stick margarines.  Add flavor without fat  · Sprinkle herbs on fish, chicken, and meat, and in soups.  · Try herbs, lemon juice, or flavored vinegar on vegetables.  · Add chopped onions, garlic, and peppers to flavor beans and rice.   Date Last Reviewed: 5/11/2015  © 1647-6696 [employer]. [address] [place] [address], [place] [address]. All rights reserved. This " information is not intended as a substitute for professional medical care. Always follow your healthcare professional's instructions.        Living Well with Epilepsy  People with epilepsy can lead healthy, productive lives. Life with epilepsy can be challenging, but there are things you can do to make it easier. For example, you can pay attention to your emotions. If you feel down, upset, or scared, talk to your healthcare provider. And be open with the people in your life. Talking about epilepsy can help them understand. It can also help you feel better.  Coping with emotions  You may be scared to go out in public for fear of having a seizure. Or you may just get frustrated with having epilepsy. Such feelings are normal. But they can lead to anxiety and depression. Treatment is available for these conditions, so talk to your healthcare provider. Discuss what can help you, such as the following:  · Support groups give you the chance to talk with other people who have epilepsy.  · Counseling helps you learn to cope with your emotions and health problems.  · Medicine can help if you have a mood disorder.     Recognizing signs of depression  Depression is an illness that affects your thoughts and feelings. It can be caused by trouble coping with epilepsy, and sometimes it may be caused by the medicines used to treat it. Depression can be serious. If you have any of the following, call your healthcare provider:  · Feeling down most of the time  · Feeling hopeless or helpless  · Losing pleasure in things you used to enjoy  · Sleeping less or more than usual  · Having a big change in appetite or weight  · Having trouble focusing, remembering, or making decisions  · Isolating yourself from friends or family    Coping at home  Epilepsy affects those around you, too. Talk with your loved ones and learn their concerns. For instance, your children may be afraid for your safety. Reassure them that you can live a long, healthy  life with epilepsy. Your partner may wonder if a normal sex life is possible. Let him or her know that epilepsy doesnt have to affect intimacy. If loved ones have questions, you can always arrange a talk with your healthcare provider.  Epilepsy and your job  Epilepsy doesnt have to keep you from working. In fact, people with epilepsy hold many kinds of jobs. But there are some issues you should consider, such as:  · What kind of work can I do? This depends on several things, like how well controlled your seizures are. Also consider whether the job involves tasks that may not be safe for you. These include driving or operating heavy machinery.  · Should I tell my boss or coworkers about my epilepsy? This is your personal choice. But you may be safer if people at your workplace are prepared to respond to a seizure. If you are concerned about losing your job, know your rights. The Americans with Disabilities Act provides work-related protections for people with epilepsy.  Date Last Reviewed: 9/10/2015  © 0817-7445 [employer]. [address] [place] [address], [place] [address]. All rights reserved. This information is not intended as a substitute for professional medical care. Always follow your healthcare professional's instructions.

## 2019-06-04 ENCOUNTER — OFFICE VISIT (OUTPATIENT)
Dept: FAMILY MEDICINE | Facility: CLINIC | Age: 44
End: 2019-06-04
Payer: MEDICAID

## 2019-06-04 ENCOUNTER — TELEPHONE (OUTPATIENT)
Dept: FAMILY MEDICINE | Facility: CLINIC | Age: 44
End: 2019-06-04

## 2019-06-04 VITALS
BODY MASS INDEX: 37.05 KG/M2 | HEART RATE: 71 BPM | SYSTOLIC BLOOD PRESSURE: 99 MMHG | WEIGHT: 217 LBS | DIASTOLIC BLOOD PRESSURE: 67 MMHG | HEIGHT: 64 IN | TEMPERATURE: 99 F

## 2019-06-04 DIAGNOSIS — J02.9 SORE THROAT: Primary | ICD-10-CM

## 2019-06-04 DIAGNOSIS — R05.9 COUGH: ICD-10-CM

## 2019-06-04 DIAGNOSIS — Z20.1 EXPOSURE TO TB: ICD-10-CM

## 2019-06-04 PROCEDURE — 99213 OFFICE O/P EST LOW 20 MIN: CPT | Mod: ,,, | Performed by: INTERNAL MEDICINE

## 2019-06-04 PROCEDURE — 86580 POCT TB SKIN TEST: ICD-10-PCS | Mod: ,,, | Performed by: INTERNAL MEDICINE

## 2019-06-04 PROCEDURE — 99213 PR OFFICE/OUTPT VISIT, EST, LEVL III, 20-29 MIN: ICD-10-PCS | Mod: ,,, | Performed by: INTERNAL MEDICINE

## 2019-06-04 PROCEDURE — 86580 TB INTRADERMAL TEST: CPT | Mod: ,,, | Performed by: INTERNAL MEDICINE

## 2019-06-04 NOTE — PROGRESS NOTES
Subjective:       Patient ID: Jak Cody is a 43 y.o. female.    Chief Complaint: Sore Throat    Ms Cody- is a 43-year-old  female who is having sore throat with some cough. She has been a chronic smoker.     She also reports exposure to her boyfriend who has been tested positive for TB probably to a skin test. The nature and details of that diagnosis is unclear at this point. Patient has been in relationship with her boyfriend for approximately 19 years. Please note that her boyfriend was incarcerated in past and also was exposed to an elderly gentleman who had tuberculosis.    Patient has not lost any weight. She does have rheumatoid arthritis and she is pending evaluation for the same. She was tried on methotrexate but that did not seem to help her.    She also has a seizure disorder for which she has to follow-up with neurology. She recently had a breakthrough seizure.    Sore Throat    This is a new problem. The current episode started in the past 7 days. Neither side of throat is experiencing more pain than the other. Associated symptoms include coughing. Pertinent negatives include no congestion, headaches or shortness of breath.   Cough   This is a new problem. The current episode started 1 to 4 weeks ago. The problem has been unchanged. The cough is non-productive. Associated symptoms include a sore throat. Pertinent negatives include no chest pain, chills, fever, headaches, postnasal drip, rash or shortness of breath. Risk factors: Exposure to TB. There is no history of environmental allergies.       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, controlled     Hypovitaminosis D 3/28/2018    As per Neurlogy notes at [facility] -Dr Rochelle Bennett MD neurology started on Vitamin D 50,000    Overdose of illicit drug     Seizures      Social History      Socioeconomic History    Marital status: Single     Spouse name: Not on file    Number of children: Not on file    Years of education: Not on file    Highest education level: Not on file   Occupational History    Occupation: disabled   Social Needs    Financial resource strain: Not on file    Food insecurity:     Worry: Not on file     Inability: Not on file    Transportation needs:     Medical: Not on file     Non-medical: Not on file   Tobacco Use    Smoking status: Current Every Day Smoker     Packs/day: 0.50     Types: Cigarettes    Smokeless tobacco: Never Used   Substance and Sexual Activity    Alcohol use: No    Drug use: No    Sexual activity: Not on file   Lifestyle    Physical activity:     Days per week: Not on file     Minutes per session: Not on file    Stress: Not on file   Relationships    Social connections:     Talks on phone: Not on file     Gets together: Not on file     Attends Confucianist service: Not on file     Active member of club or organization: Not on file     Attends meetings of clubs or organizations: Not on file     Relationship status: Not on file   Other Topics Concern    Not on file   Social History Narrative    Not on file     Past Surgical History:   Procedure Laterality Date    FRACTURE SURGERY  2000    left femur     Family History   Problem Relation Age of Onset    Arthritis Mother     Asthma Mother     COPD Mother     Depression Mother     Diabetes Mother     Headaches Mother     Cancer Maternal Grandfather     Heart disease Paternal Grandfather     Heart attack Paternal Grandfather        Review of Systems   Constitutional: Positive for fatigue. Negative for activity change, chills, fever and unexpected weight change.   HENT: Positive for sore throat. Negative for congestion, postnasal drip and sinus pressure.    Eyes: Negative for pain, discharge and visual disturbance.   Respiratory: Positive for cough. Negative for chest tightness and  "shortness of breath.    Cardiovascular: Negative for chest pain, palpitations and leg swelling.   Gastrointestinal: Negative for abdominal distention.   Genitourinary: Negative for difficulty urinating, dysuria, flank pain and frequency.   Musculoskeletal: Negative for arthralgias and joint swelling.        Rheumatoid arthritis   Skin: Negative for color change, pallor and rash.   Allergic/Immunologic: Negative for environmental allergies, food allergies and immunocompromised state.   Neurological: Positive for seizures. Negative for dizziness, light-headedness and headaches.   Hematological: Negative for adenopathy. Does not bruise/bleed easily.   Psychiatric/Behavioral: Positive for dysphoric mood. Negative for agitation and confusion. The patient is not nervous/anxious.          Objective:      Blood pressure 99/67, pulse 71, temperature 98.6 °F (37 °C), height 5' 4" (1.626 m), weight 98.4 kg (217 lb). Body mass index is 37.25 kg/m².  Physical Exam   Constitutional: She is oriented to person, place, and time. She appears well-developed. She is cooperative. No distress.   BMI is 37.   HENT:   Head: Normocephalic and atraumatic.   Right Ear: Tympanic membrane normal.   Left Ear: Tympanic membrane normal.   Eyes: Pupils are equal, round, and reactive to light. Conjunctivae, EOM and lids are normal. Lids are everted and swept, no foreign bodies found. Right pupil is round and reactive. Left pupil is round and reactive.   Neck: Trachea normal and normal range of motion. Neck supple.   Cardiovascular: Normal rate, regular rhythm, S1 normal, S2 normal and normal heart sounds.   Pulmonary/Chest: Breath sounds normal.   Abdominal: Soft. Bowel sounds are normal. There is no rigidity and no guarding.   Musculoskeletal:        Right hand: She exhibits tenderness. She exhibits no deformity. Normal sensation noted.        Left hand: She exhibits no tenderness and no deformity.   Lymphadenopathy:     She has no cervical " adenopathy.     She has no axillary adenopathy.   Neurological: She is alert and oriented to person, place, and time.   Skin: Skin is warm and dry.   Psychiatric: Her mood appears anxious. Her affect is labile.   Dysphoric and somewhat more amenable today chest x-ray has been reviewed and it shows as compared to prior loud and animated presentation.    Nursing note and vitals reviewed.        Assessment:       1. Sore throat    2. Exposure to TB    3. Cough           Orders Only on 05/02/2019   Component Date Value Ref Range Status    WBC 05/02/2019 8.1  5.0 - 10.0 K/uL Final    RBC 05/02/2019 4.68  3.50 - 5.50 M/uL Final    Hemoglobin 05/02/2019 14.0  12.0 - 15.0 g/dL Final    Hematocrit 05/02/2019 43.2  36.0 - 48.0 % Final    Mean Corpuscular Volume 05/02/2019 92.3  79.0 - 98.0 fL Final    Mean Corpuscular Hemoglobin 05/02/2019 29.9  25.0 - 35.0 pg Final    Mean Corpuscular Hemoglobin Conc 05/02/2019 32.4  31.0 - 36.0 g/dL Final    Platelets 05/02/2019 300  140 - 440 K/uL Final    nRBC% 05/02/2019 0  % Final    Glucose 05/02/2019 105* 70 - 99 mg/dL Final    BUN, Bld 05/02/2019 10  8 - 20 mg/dL Final    Creatinine 05/02/2019 0.71  0.60 - 1.40 mg/dL Final    Calcium 05/02/2019 8.8  7.7 - 10.4 mg/dL Final    Sodium 05/02/2019 136  134 - 144 mmol/L Final    Potassium 05/02/2019 4.2  3.5 - 5.0 mmol/L Final    Chloride 05/02/2019 105  98 - 110 mmol/L Final    CO2 05/02/2019 24.8  22.8 - 31.6 mmol/L Final    Albumin 05/02/2019 4.0  3.1 - 4.7 g/dL Final    Total Bilirubin 05/02/2019 0.8  0.3 - 1.0 mg/dL Final    Alkaline Phosphatase 05/02/2019 48  40 - 104 IU/L Final    Total Protein 05/02/2019 7.0  6.0 - 8.2 g/dL Final    ALT (SGPT) 05/02/2019 13  3 - 33 IU/L Final    AST 05/02/2019 13  10 - 40 IU/L Final   IMPRESSION:    Linear opacity likely representing atelectasis or scarring, likely within the  lingula.    Otherwise no acute infiltrate.      Plan:           Sore throat    Exposure to TB  -      POCT TB Skin Test Read  -     X-Ray Chest PA And Lateral    Cough  -     X-Ray Chest PA And Lateral      Increase your water intake to 64-80 oz daily    Nasal Saline spray (Over the counter [place] spray or [place])  2 sprays each nostril 2-3 times a day for nasal congestion    Tylenol 500 mg 2 tablets or Ibuprofen 200 mg 2 tablets every 4-6 hours as needed for fever, headaches, sore throat, ear pain, bodyaches, and/or nasal/sinus inflammation    Warm salt water gargles with 1/2 cup water and 1 tablespoon salt every 4 hours    Warm tea with honey and lemon    Follow up with PCP in 1 week if symptoms do not resolve        Chest  X ray- PPD test    If any abnormal she will have Quantiferon Gold    Keep regular follow       Chest x-ray has been reviewed and it shows a small plate atelectasis. No evidence of tuberculosis.      Current Outpatient Medications:     albuterol (PROVENTIL/VENTOLIN HFA) 90 mcg/actuation inhaler, INHALE ONE PUFF BY MOUTH INTO THE LUNGS EVERY 4 TO 6 HOURS AS NEEDED FOR WHEEZING, Disp: 18 each, Rfl: 5    ergocalciferol (ERGOCALCIFEROL) 50,000 unit Cap, Take 1 capsule (50,000 Units total) by mouth every 7 days., Disp: 1 capsule, Rfl: 11    folic acid (FOLVITE) 1 MG tablet, Take 1 tablet (1 mg total) by mouth once daily., Disp: 30 tablet, Rfl: 2    ondansetron (ZOFRAN-ODT) 4 MG TbDL, Take 1 tablet (4 mg total) by mouth every 6 (six) hours as needed., Disp: 30 tablet, Rfl: 2    phenytoin (DILANTIN) 100 MG ER capsule, Take 3 capsules (300 mg total) by mouth once daily., Disp: 90 capsule, Rfl: 2

## 2019-06-04 NOTE — PROGRESS NOTES
Please notify patient that chest x-ray does not show any evidence of tuberculosis but does show scar of probably prior infection or pneumonia.

## 2019-06-04 NOTE — PATIENT INSTRUCTIONS
Viral Upper Respiratory Illness (Adult)  You have a viral upper respiratory illness (URI), which is another term for the common cold. This illness is contagious during the first few days. It is spread through the air by coughing and sneezing. It may also be spread by direct contact (touching the sick person and then touching your own eyes, nose, or mouth). Frequent handwashing will decrease risk of spread. Most viral illnesses go away within 7 to 10 days with rest and simple home remedies. Sometimes the illness may last for several weeks. Antibiotics will not kill a virus, and they are generally not prescribed for this condition.    Home care  · If symptoms are severe, rest at home for the first 2 to 3 days. When you resume activity, don't let yourself get too tired.  · Avoid being exposed to cigarette smoke (yours or others).  · You may use acetaminophen or ibuprofen to control pain and fever, unless another medicine was prescribed. (Note: If you have chronic liver or kidney disease, have ever had a stomach ulcer or gastrointestinal bleeding, or are taking blood-thinning medicines, talk with your healthcare provider before using these medicines.) Aspirin should never be given to anyone under 18 years of age who is ill with a viral infection or fever. It may cause severe liver or brain damage.  · Your appetite may be poor, so a light diet is fine. Avoid dehydration by drinking 6 to 8 glasses of fluids per day (water, soft drinks, juices, tea, or soup). Extra fluids will help loosen secretions in the nose and lungs.  · Over-the-counter cold medicines will not shorten the length of time youre sick, but they may be helpful for the following symptoms: cough, sore throat, and nasal and sinus congestion. (Note: Do not use decongestants if you have high blood pressure.)  Follow-up care  Follow up with your healthcare provider, or as advised.  When to seek medical advice  Call your healthcare provider right away if any  of these occur:  · Cough with lots of colored sputum (mucus)  · Severe headache; face, neck, or ear pain  · Difficulty swallowing due to throat pain  · Fever of 100.4°F (38°C)  Call 911, or get immediate medical care  Call emergency services right away if any of these occur:  · Chest pain, shortness of breath, wheezing, or difficulty breathing  · Coughing up blood  · Inability to swallow due to throat pain  Date Last Reviewed: 9/13/2015  © 4594-1221 [employer]. [address] [place] [address]. All rights reserved. This information is not intended as a substitute for professional medical care. Always follow your healthcare professional's instructions.

## 2019-06-04 NOTE — TELEPHONE ENCOUNTER
----- Message from Reymundo Serra MD sent at 6/4/2019  4:50 PM CDT -----  The results are within acceptable range.  Please keep regular follow up.

## 2019-07-16 ENCOUNTER — OFFICE VISIT (OUTPATIENT)
Dept: RHEUMATOLOGY | Facility: CLINIC | Age: 44
End: 2019-07-16
Payer: MEDICAID

## 2019-07-16 VITALS — WEIGHT: 215.63 LBS | BODY MASS INDEX: 37.01 KG/M2 | DIASTOLIC BLOOD PRESSURE: 65 MMHG | SYSTOLIC BLOOD PRESSURE: 94 MMHG

## 2019-07-16 DIAGNOSIS — Z87.898 HISTORY OF SNORING: ICD-10-CM

## 2019-07-16 DIAGNOSIS — M19.90 CHRONIC INFLAMMATORY ARTHRITIS: Primary | ICD-10-CM

## 2019-07-16 DIAGNOSIS — M79.7 FIBROMYALGIA: ICD-10-CM

## 2019-07-16 DIAGNOSIS — F41.8 MIXED ANXIETY DEPRESSIVE DISORDER: ICD-10-CM

## 2019-07-16 LAB — CRP SERPL-MCNC: 0.67 MG/DL (ref 0–1.4)

## 2019-07-16 PROCEDURE — 99205 OFFICE O/P NEW HI 60 MIN: CPT | Mod: ,,, | Performed by: INTERNAL MEDICINE

## 2019-07-16 PROCEDURE — 99205 PR OFFICE/OUTPT VISIT, NEW, LEVL V, 60-74 MIN: ICD-10-PCS | Mod: ,,, | Performed by: INTERNAL MEDICINE

## 2019-07-16 NOTE — PATIENT INSTRUCTIONS
Fibromyalgia  Fibromyalgia is a chronic condition. It causes pain and tenderness in connective tissues. This causes muscle pain. Often, there are also many tender areas throughout the body. Symptoms may also include stiffness and feelings of numbness and tingling. Symptoms may be worse upon waking up. They may increase with poor sleep, heavy activity, cold or damp weather, anxiety, or stress.  People with fibromyalgia often feel tired. They may have trouble sleeping. Other symptoms include morning stiffness, headaches, and painful menstrual periods. Some people have problems with thinking clearly and changes in memory.  The cause of fibromyalgia is not known. Symptoms are similar to that of other diseases. These include rheumatoid arthritis, low thyroid, chronic fatigue syndrome, and Lyme disease. In some cases, these diseases may occur together.  Fibromyalgia is often treated with medicines. You and your healthcare provider can discuss the medicine that may work best for you. You may have to try more than one medicine or combination of medicines before you find what works for you.  Home care  · If your healthcare provider has prescribed or recommended medicines, take them as directed.  · Rest as needed. Try to get enough sleep. If you have trouble sleeping, discuss this with your healthcare provider.   · Be active. Regular exercise can help manage symptoms. Some options include walking, swimming, and biking. Strengthening exercises may also be helpful. Talk to your healthcare provider about the best ways to be active.  · Follow a healthy diet. Limit caffeine and alcohol. If you smoke, ask your healthcare provider for help to stop.  · Notice how your body reacts to stress. Learn to listen to your body signals. This will help you take action before the stress becomes severe.  · Learn relaxation techniques. Also consider joining a stress reduction program or class.  · Talk to your healthcare provider about trying  complementary treatments. These include acupuncture, hypnosis, and biofeedback. Yoga and alden chi may be helpful.  · Ask your healthcare provider about cognitive behavioral therapy (CBT). This type of counseling can help people with fibromyalgia cope better with their illness.  Follow-up care  Follow up with your healthcare provider or as advised by our staff. In many cases, fibromyalgia is best treated with a team approach.  This may involve your primary care provider, a rheumatologist, a physical therapist, and a mental health professional.   For more information: National [place] of Arthritis and Musculoskeletal and Skin Diseases (NIAMS)  www.niams.nih.gov 657-722-9907  When to seek medical advice  Contact your healthcare provider right away if any of these occur:  · Symptoms getting worse or new symptoms developing  · You feel hopeless, helpless, or lose interest in day-to-day life  Date Last Reviewed: 3/1/2017  © 4666-8338 [employer]. [address] [place] [address]. All rights reserved. This information is not intended as a substitute for professional medical care. Always follow your healthcare professional's instructions.        Rheumatoid Factor (Blood)  Does this test have other names?  RF blood test  What is this test?  This test measures the level of a substance called rheumatoid factor (RF) in your blood. It helps your healthcare provider find out whether you have rheumatoid arthritis (RA).  RF is an autoantibody that responds to inflammation caused by RA. Antibodies increase in your blood when they find a foreign substance, such as bacteria. Autoantibodies, on the other hand, attack your own body's proteins.  RF is linked to long-term (chronic) inflammation. So it may be higher if you have RA, which is an inflammatory condition. Although this autoantibody does not directly cause arthritis, it plays a role in increasing inflammation if you have joint damage. RF is found in the  blood of 70% to 80% of people with RA.    This test may also help diagnose other rheumatic diseases, chronic infections, or autoimmune diseases, such as Sjögren's syndrome or lupus erythematosus..  Why do I need this test?  You may need this test if your healthcare provider suspects that you have RA. Symptoms of RA include:  · Low-grade fever  · Extreme tiredness  · Loss of weight  · Muscle pain  · Numbing or tingling sensation in your hands  · Morning joint stiffness  Arthritis pain often affects finger and toe joints. The knees and shoulders may also be affected by RA.  If RA is not treated, it can severely affect your daily life and can make it hard to walk. It's important to start treatment early on. It can be hard to know the problem is RA and not other inflammatory illnesses, such as polyarthritis. Women are 3 times more likely than men to have RA. Infections and cigarette smoking may increase the pain of existing RA.  Long-term effects of RA include damage to your cartilage and bones and decreased function in your joints.  What other tests might I have along with this test?  Your healthcare provider may order other blood tests to help diagnose RA. These include:  · Anti-citrullinated peptide/protein antibody test  · Antinuclear antibody, or SHERRIE, testing  · Complete blood count, or CBC  Your provider may also order X-rays of your wrists, hands, and feet to look for joint damage.  What do my test results mean?  Many things may affect your lab test results. These include the method each lab uses to do the test. Even if your test results are different from the normal value, you may not have a problem. To learn what the results mean for you, talk with your healthcare provider.  Results are given in units per milliliter (U/mL). If your level is lower than 60 U/mL, your results are considered negative and you likely don't have RA. Levels above that may mean that you have RA or another autoimmune disease.  The normal  level for an older adult may be slightly higher than 60 U/mL.  How is this test done?  The test requires a blood sample, which is drawn through a needle from a vein in your arm.  Does this test pose any risks?  Taking a blood sample with a needle carries risks that include bleeding, infection, bruising, or feeling dizzy. When the needle pricks your arm, you may feel a slight stinging sensation or pain. Afterward, the site may be slightly sore.  What might affect my test results?  Certain infections can raise your level of RF.    How do I get ready for this test?  You don't need to prepare for this test.      © 8914-4635 [employer]. [address] [place] [address], [place] [address]. All rights reserved. This information is not intended as a substitute for professional medical care. Always follow your healthcare professional's instructions.

## 2019-07-16 NOTE — PROGRESS NOTES
[facility]           New patient visit    Notes dictated via Dragon to EPIC. Please forgive any unintentional errors.  Subjective:       Patient ID:   NAME: Jak Cody : 1975     43 y.o. female    Referring Doc: Reymundo Serra MD  Other Physicians:    Chief Complaint:  Rheumatoid Arthritis      HPI:   This patient was referred for the evaluation of possible rheumatoid arthritis. The patient has had joint and muscle pain for many years. In particular, she complains of pain in the neck and shoulders as well as in the lower back and hips. She also states she has pain and occasional swelling involving her hands. She is less definite about whether she has discrete joint swelling or diffuse swelling of the entire hand. In either case, she has not noted redness or warmth. Blood testing was done and revealed an elevated rheumatoid factor and a strongly positive anti-CCP. She was begun on treatment with methotrexate which she was unable to tolerate due to severe GI side effects. It is unclear as to how many methotrexate pills she was to have taken and how many weeks she might have been on the medication. She felt that it was not helpful in any way.    Her past medical history is significant for a seizure disorder since age 18. She also has reactive airway disease. She has been diagnosed with an anxiety-depressive disorder, apparently termed bipolar by a recent mental health provider she saw at a [facility].    ROS:   GEN:      no fever, night sweats or weight loss  SKIN:     no rashes, erythema, bruising, or swelling, no Raynauds, no photosensitivity  HEENT:  no acute changes in vision, no mouth ulcers, no sicca symptoms, no scalp tenderness, jaw claudication.  CV:        no CP, PND, + MARIEE, no orthopnea, no palpitations  PULM:    denies SOB, cough, hemoptysis, sputum or pleuritic pain  GI:          No dysphagia, + GERD, no hematemesis; no abdominal pain, + nausea, no vomiting,  constipation, diarrhea, melanotic stools, BRBPR.  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, controlled     Hypovitaminosis D 3/28/2018    As per [facility]urlog[facility] at [facility] -Dr Rochelle Bennett MD neurology started on Vitamin D 50,000    Overdose of illicit drug     Seizures      Past Surgical History:   Procedure Laterality Date    FRACTURE SURGERY  2000    left femur       Allergies:  Review of patient's allergies indicates:  No Known Allergies    Social/Family History:  Social History     Socioeconomic History    Marital status: Single     Spouse name: Not on file    Number of children: Not on file    Years of education: Not on file    Highest education level: Not on file   Occupational History    Occupation: disabled   Social Needs    Financial resource strain: Not on file    Food insecurity:     Worry: Not on file     Inability: Not on file    Transportation needs:     Medical: Not on file     Non-medical: Not on file   Tobacco Use    Smoking status: Current Every Day Smoker     Packs/day: 0.50     Types: Cigarettes    Smokeless tobacco: Never Used   Substance and Sexual Activity    Alcohol use: No    Drug use: No    Sexual activity: Not on file   Lifestyle    Physical activity:     Days per week: Not on file     Minutes per session: Not on file    Stress: Not on file   Relationships    Social connections:     Talks on phone: Not on file     Gets together: Not on file     Attends Tenriism service: Not on file     Active member of club or organization: Not on file     Attends meetings of clubs or organizations: Not on file      Relationship status: Not on file   Other Topics Concern    Not on file   Social History Narrative    Not on file     Family History   Problem Relation Age of Onset    Arthritis Mother     Asthma Mother     COPD Mother     Depression Mother     Diabetes Mother     Headaches Mother     Cancer Maternal Grandfather     Heart disease Paternal Grandfather     Heart attack Paternal Grandfather      FAMILY HISTORY: negative for Connective Tissue Disease  There are no end exam questions for this visit.    Medications:    Current Outpatient Medications:     albuterol (PROVENTIL/VENTOLIN HFA) 90 mcg/actuation inhaler, INHALE ONE PUFF BY MOUTH INTO THE LUNGS EVERY 4 TO 6 HOURS AS NEEDED FOR WHEEZING, Disp: 18 each, Rfl: 5    ergocalciferol (ERGOCALCIFEROL) 50,000 unit Cap, Take 1 capsule (50,000 Units total) by mouth every 7 days., Disp: 1 capsule, Rfl: 11    folic acid (FOLVITE) 1 MG tablet, Take 1 tablet (1 mg total) by mouth once daily., Disp: 30 tablet, Rfl: 2    ondansetron (ZOFRAN-ODT) 4 MG TbDL, Take 1 tablet (4 mg total) by mouth every 6 (six) hours as needed., Disp: 30 tablet, Rfl: 2    phenytoin (DILANTIN) 100 MG ER capsule, Take 3 capsules (300 mg total) by mouth once daily., Disp: 90 capsule, Rfl: 2        Objective:     Vitals:  Blood pressure 94/65, weight 97.8 kg (215 lb 9.6 oz).    Physical Examination:   GEN:     wn/wd female in no apparent distress  SKIN:    no rashes, no sclerodactyly, no Raynaud's, no periungual erythema, no digital tip tapering, no nailbed pitting  HEAD:   no alopecia, no scalp tenderness, no temporal artery tenderness or induration.  EYES:   no conjunctival pallor, no icterus  ENT:     no thrush, no mucosal dryness or ulcerations, adequate oral hygiene, sub-optimal dentition.  NECK:  supple x 6, no masses, no thyromegaly, no lymphadenopathy.  CV:        S1 and S2, RRR, no murmurs, gallop or rubs  CHEST: Normal respiratory effort;  normal breath sounds/no adventitious  sounds. No signs of consolidation.  ABD:      non-tender and non-distended; soft; normal bowel sounds; no rebound, guarding, or tenderness. No hepatosplenomegaly.  Musculoskeletal:  No evidence of synovial proliferation or of active synovitis involving the small joints of the hands or feet. There is no evidence of inflammatory disease or of significant degenerative change of the large joints. Range of motion of all joints is within normal limits. Muscle strength is 5/5×4. Triggerpoints are highly reactive in 6 of 6 tested locations. Range of motion of the back is normal. Posture demonstrates some thoracolumbar scoliosis. Gait is brisk and stable.  EXTREM: no clubbing, cyanosis or edema. normal pulses. Tonya's - x2  NEURO:  grossly intact; motor/sensory WNL; no tremors  PSYCH:   Anxious, pressured speech, occasional tangential thinking, content normal, insight limited, not apparently suicidal nor homicidal            Labs:   Lab Results   Component Value Date    WBC 8.1 05/02/2019    HGB 14.0 05/02/2019    HCT 43.2 05/02/2019    MCV 92.3 05/02/2019     05/02/2019   CMP@  Sodium   Date Value Ref Range Status   05/02/2019 136 134 - 144 mmol/L      Potassium   Date Value Ref Range Status   05/02/2019 4.2 3.5 - 5.0 mmol/L      Chloride   Date Value Ref Range Status   05/02/2019 105 98 - 110 mmol/L      CO2   Date Value Ref Range Status   05/02/2019 24.8 22.8 - 31.6 mmol/L      Glucose   Date Value Ref Range Status   05/02/2019 105 (H) 70 - 99 mg/dL      BUN, Bld   Date Value Ref Range Status   05/02/2019 10 8 - 20 mg/dL      Creatinine   Date Value Ref Range Status   05/02/2019 0.71 0.60 - 1.40 mg/dL      Calcium   Date Value Ref Range Status   05/02/2019 8.8 7.7 - 10.4 mg/dL      Total Protein   Date Value Ref Range Status   05/02/2019 7.0 6.0 - 8.2 g/dL      Albumin   Date Value Ref Range Status   05/02/2019 4.0 3.1 - 4.7 g/dL      Total Bilirubin   Date Value Ref Range Status   05/02/2019 0.8 0.3 - 1.0 mg/dL       Alkaline Phosphatase   Date Value Ref Range Status   05/02/2019 48 40 - 104 IU/L      AST   Date Value Ref Range Status   05/02/2019 13 10 - 40 IU/L      CRP   Date Value Ref Range Status   07/16/2019 0.67 0.00 - 1.40 mg/dL      Rheumatoid Factor   Date Value Ref Range Status   01/08/2019 69.3 (H) 0.0 - 13.9 IU/mL      Comment:     Performed at: [facility], [facility] [address], [id number] Harvey MD, Phone:  [phone]         Radiology/Diagnostic Studies:    CCP + >250 IU/mL    Assessment/Discussion/Plan:   43 y.o. female with generalized musculoskeletal pain without clear evidence of inflammatory disease, with high titer positive anti-CCP- see assessment/plan     PLAN:   I am not convinced that she has rheumatoid arthritis. At this time, I do not detect any active synovitis or evidence of chronic synovial proliferation. The rheumatoid factor may be insignificant, though the anti-CCP is generally not insignificant. Previous acute phase reactants were within normal limits. Overall, that picture suggests to me a predilection to develop rheumatoid arthritis ut does not make a diagnosis of current rheumatoid arthritis.  I'm going to obtain another set of acute phase reactants including a SPEP. If these are negative, we will reinforce my thought process she does not have active rheumatoid arthritis. If they are elevated, I will consider ordering a nuclear medicine bone scan to get an objective look at active inflammation throughout the skeleton. I reserve this for cases where the clinical information conflicts with the laboratory data.  I have also ordered an SHERRIE/SSA with a reflex to an SHERRIE profile. This should be considered in an atypical presentation of positive antibodies, particularly in someone with a history of seizures.    I do think that she has fibromyalgia. She has a history of very significant anxiety-depressive disorder, her examination suggests some significant  behavioral abnormality and her physical examination demonstrates extremely reactive trigger-points. I have discussed the diagnosis with her and I have provided her with printed literature on the subject. However, I have not offered her any specific treatment for fibromyalgia. It is my opinion that her psychiatric issues are such that management for this diagnosis should be handled only by psychiatry.    RTC:  I will see her back if blood testing suggests a rheumatologic etiology or if she is re-referred.            Electronically signed by Ayush Jones MD      More than 60 minutes were spent reviewing the previous records, in face-to-face consultation with the patient, and in composing this note

## 2019-07-16 NOTE — LETTER
July 16, 2019      Reymundo Serra MD  [address]  [address]  [place] [address]           [facility]  [address]  [address]  [place] [address]  Phone: [phone]  Fax: [phone]          Patient: Jak Cody   MR Number: [id number]   YOB: 1975   Date of Visit: 7/16/2019       Dear Dr. Reymundo Serra:    Thank you for referring Jak Cody to me for evaluation. Attached you will find relevant portions of my assessment and plan of care.    If you have questions, please do not hesitate to call me. I look forward to following Jak Cody along with you.    Sincerely,    Ayush Jones MD    Enclosure  CC:  No Recipients    If you would like to receive this communication electronically, please contact [email or web address] or [phone] to request more information on [employer] access.    For providers and/or their staff who would like to refer a patient to [facility], please contact us through our one-stop-shop provider referral line, [facility], at [phone].    If you feel you have received this communication in error or would no longer like to receive these types of communications, please e-mail [email or web address]

## 2019-07-17 LAB
ALBUMIN SERPL-MCNC: 3.8 G/DL (ref 2.9–4.4)
ALBUMIN/GLOB SERPL ELPH: 1.1 {RATIO} (ref 0.7–1.7)
ALPHA 1 GLOBULIN/PROTEIN TOTAL: 0.3 G/DL (ref 0–0.4)
ALPHA 2 GLOBULIN/PROTEIN TOTAL: 0.8 G/DL (ref 0.4–1)
B-GLOBULIN FLD ELPH-MCNC: 1.2 G/DL (ref 0.7–1.3)
GAMMA GLOB FLD ELPH-MCNC: 1.2 G/DL (ref 0.4–1.8)
GLOBULIN SER CALC-MCNC: 3.4 G/DL (ref 2.2–3.9)
Lab: NORMAL
M PROTEIN MFR UR ELPH: NORMAL G/DL
PROT SERPL-MCNC: 7.2 G/DL (ref 6–8.5)

## 2019-09-16 ENCOUNTER — OFFICE VISIT (OUTPATIENT)
Dept: FAMILY MEDICINE | Facility: CLINIC | Age: 44
End: 2019-09-16
Payer: MEDICAID

## 2019-09-16 VITALS
SYSTOLIC BLOOD PRESSURE: 125 MMHG | HEART RATE: 79 BPM | DIASTOLIC BLOOD PRESSURE: 80 MMHG | BODY MASS INDEX: 36.88 KG/M2 | HEIGHT: 64 IN | WEIGHT: 216 LBS

## 2019-09-16 DIAGNOSIS — M79.7 FIBROMYALGIA: ICD-10-CM

## 2019-09-16 DIAGNOSIS — L20.89 FLEXURAL ATOPIC DERMATITIS: Primary | ICD-10-CM

## 2019-09-16 DIAGNOSIS — R06.83 SNORING: ICD-10-CM

## 2019-09-16 DIAGNOSIS — R53.83 OTHER FATIGUE: ICD-10-CM

## 2019-09-16 DIAGNOSIS — Z12.39 SCREENING FOR BREAST CANCER: ICD-10-CM

## 2019-09-16 DIAGNOSIS — F41.9 ANXIETY: ICD-10-CM

## 2019-09-16 PROBLEM — M25.562 CHRONIC PAIN OF LEFT KNEE: Status: RESOLVED | Noted: 2018-03-13 | Resolved: 2019-09-16

## 2019-09-16 PROBLEM — M25.531 RIGHT WRIST PAIN: Status: RESOLVED | Noted: 2019-01-07 | Resolved: 2019-09-16

## 2019-09-16 PROBLEM — G89.29 CHRONIC PAIN OF LEFT KNEE: Status: RESOLVED | Noted: 2018-03-13 | Resolved: 2019-09-16

## 2019-09-16 PROBLEM — R92.8 ABNORMAL FINDING ON MAMMOGRAPHY: Status: RESOLVED | Noted: 2017-06-27 | Resolved: 2019-09-16

## 2019-09-16 PROBLEM — Z20.1 EXPOSURE TO TB: Status: RESOLVED | Noted: 2019-06-04 | Resolved: 2019-09-16

## 2019-09-16 PROBLEM — J02.9 ACUTE PHARYNGITIS: Status: RESOLVED | Noted: 2018-10-17 | Resolved: 2019-09-16

## 2019-09-16 PROBLEM — J02.9 SORE THROAT: Status: RESOLVED | Noted: 2019-06-04 | Resolved: 2019-09-16

## 2019-09-16 PROBLEM — Z53.21 PATIENT LEFT WITHOUT BEING SEEN: Status: RESOLVED | Noted: 2019-03-16 | Resolved: 2019-09-16

## 2019-09-16 PROCEDURE — 99214 OFFICE O/P EST MOD 30 MIN: CPT | Mod: S$PBB,,, | Performed by: INTERNAL MEDICINE

## 2019-09-16 PROCEDURE — 99999 PR PBB SHADOW E&M-EST. PATIENT-LVL IV: ICD-10-PCS | Mod: PBBFAC,,, | Performed by: INTERNAL MEDICINE

## 2019-09-16 PROCEDURE — 99214 PR OFFICE/OUTPT VISIT, EST, LEVL IV, 30-39 MIN: ICD-10-PCS | Mod: S$PBB,,, | Performed by: INTERNAL MEDICINE

## 2019-09-16 PROCEDURE — 99214 OFFICE O/P EST MOD 30 MIN: CPT | Mod: PBBFAC | Performed by: INTERNAL MEDICINE

## 2019-09-16 PROCEDURE — 99999 PR PBB SHADOW E&M-EST. PATIENT-LVL IV: CPT | Mod: PBBFAC,,, | Performed by: INTERNAL MEDICINE

## 2019-09-16 RX ORDER — TRIAMCINOLONE ACETONIDE 1 MG/G
CREAM TOPICAL 2 TIMES DAILY
Qty: 15 G | Refills: 1 | Status: SHIPPED | OUTPATIENT
Start: 2019-09-16 | End: 2019-10-10 | Stop reason: SDUPTHER

## 2019-09-16 RX ORDER — FLUOCINONIDE 0.5 MG/G
CREAM TOPICAL 2 TIMES DAILY
Qty: 15 G | Refills: 0 | Status: SHIPPED | OUTPATIENT
Start: 2019-09-16 | End: 2019-09-16

## 2019-09-16 RX ORDER — ASPIRIN 325 MG
50000 TABLET, DELAYED RELEASE (ENTERIC COATED) ORAL
Refills: 1 | COMMUNITY
Start: 2019-07-23 | End: 2019-09-16

## 2019-09-16 NOTE — PROGRESS NOTES
Subjective:       Patient ID: Jak Cody is a 43 y.o. female.    Chief Complaint: Seizures    Patient comes for follow-up and evaluation.  She has multiple medical issues and I have taken the top 3 or 4 of them for to address for today.  She tends to be generally scattered and unfocussed on her medical issues almost to the point of citing a catastrophic doom.    Her main issues are:-    Erroneously entries in chart which she wants to correct.  They are as follows.    1) documentation of Drawing disability income:-patient states that she does not draw a disability income at this point though she may consider doing in future.  She does have Medicaid what she is supported by will wish years and other people financially.      2) documentation of Miscarriages- I am not sure, where this is mentioned in the chart-but patient has noted it somewhere and she states that she never had any children or any miscarriages.  And she was never pregnant.      3) documentation of-Illicit substance overdose  past - this has been mentioned in chart and patient states categorically that she was never diagnosed with illicit substance overdose.  (She does admit to using marijuana.)  She does not think she had overdosed on this medication in past.    As far as her seizures are concerned, she is under the care of a neurologist.  I had temporarily provided her Dilantin when she ran out of her medication.    Another main issue is complains of snoring and worry about diagnosis of sleep apnea.  She also feels fatigued during the daytime.  Feels she has no energy.  Her brother who is mostly more than her has recently been diagnosed with sleep apnea.  I have not had a thyroid test last several months or years.      She continues to have symptoms of shoulder pain, arthritis and fibromyalgia like symptoms.  Previously she had elevated rheumatoid factor and she has seen Dr. Jones who believes at this point she does not have full-blown  rheumatoid arthritis and probably has more symptoms of fibromyalgia., I tend to agree as patient seems to be mostly anxious and dysphoric with continued smoking and probably there is a lot of magnification of pain.    She also reports a rash in the right elbow region.  This has been going on for several weeks and she thinks this is a bug bite.  No similar rash elsewhere.  No fever.  (No history of IV drug abuse)    Seizures    This is a chronic problem. The problem has been gradually improving. Pertinent negatives include no sleepiness, no confusion, no headaches, no speech difficulty, no visual disturbance, no neck stiffness, no sore throat, no chest pain, no cough, no nausea, no vomiting, no diarrhea and no muscle weakness. Characteristics include apnea (Snoring). There has been no fever.   Rash   This is a new problem. The current episode started 1 to 4 weeks ago. The problem is unchanged. The affected locations include the right elbow. The rash is characterized by blistering and itchiness. She was exposed to nothing. Associated symptoms include fatigue. Pertinent negatives include no congestion, cough, diarrhea, eye pain, fever, shortness of breath, sore throat or vomiting.   Pain   This is a chronic problem. The current episode started more than 1 year ago. The problem occurs constantly. The problem has been waxing and waning. Associated symptoms include arthralgias, fatigue, myalgias and a rash. Pertinent negatives include no chest pain, chills, congestion, coughing, fever, headaches, joint swelling, nausea, sore throat or vomiting. The symptoms are aggravated by stress, twisting and exertion. She has tried rest for the symptoms. The treatment provided mild relief.       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, controlled     Hypovitaminosis D 3/28/2018    As per  Neurlogy notes at [facility] -Dr Rochelle Bennett MD neurology started on Vitamin D 50,000    Seizures      Social History     Socioeconomic History    Marital status: Single     Spouse name: Not on file    Number of children: Not on file    Years of education: Not on file    Highest education level: Not on file   Occupational History    Occupation: disabled   Social Needs    Financial resource strain: Not on file    Food insecurity:     Worry: Not on file     Inability: Not on file    Transportation needs:     Medical: Not on file     Non-medical: Not on file   Tobacco Use    Smoking status: Current Every Day Smoker     Packs/day: 0.50     Types: Cigarettes    Smokeless tobacco: Never Used   Substance and Sexual Activity    Alcohol use: No    Drug use: No    Sexual activity: Not on file   Lifestyle    Physical activity:     Days per week: Not on file     Minutes per session: Not on file    Stress: To some extent   Relationships    Social connections:     Talks on phone: Not on file     Gets together: Not on file     Attends Bahai service: Not on file     Active member of club or organization: Not on file     Attends meetings of clubs or organizations: Not on file     Relationship status: Not on file   Other Topics Concern    Not on file   Social History Narrative    Not on file     Past Surgical History:   Procedure Laterality Date    FRACTURE SURGERY  2000    left femur     Family History   Problem Relation Age of Onset    Arthritis Mother     Asthma Mother     COPD Mother     Depression Mother     Diabetes Mother     Headaches Mother     Cancer Maternal Grandfather     Heart disease Paternal Grandfather     Heart attack Paternal Grandfather        Review of Systems   Constitutional: Positive for fatigue. Negative for activity change, chills, fever and unexpected weight change.   HENT: Negative for congestion, postnasal drip, sinus pressure and sore throat.    Eyes: Negative for pain,  "discharge and visual disturbance.   Respiratory: Positive for apnea (Snoring). Negative for cough, chest tightness and shortness of breath.    Cardiovascular: Negative for chest pain, palpitations and leg swelling.   Gastrointestinal: Negative for abdominal distention, diarrhea, nausea and vomiting.   Genitourinary: Negative for difficulty urinating, dysuria, flank pain and frequency.   Musculoskeletal: Positive for arthralgias and myalgias. Negative for joint swelling.        Rheumatoid arthritis   Skin: Positive for rash. Negative for color change and pallor.   Allergic/Immunologic: Negative for environmental allergies, food allergies and immunocompromised state.   Neurological: Positive for seizures. Negative for dizziness, speech difficulty, light-headedness and headaches.   Hematological: Negative for adenopathy. Does not bruise/bleed easily.   Psychiatric/Behavioral: Positive for dysphoric mood. Negative for agitation and confusion. The patient is not nervous/anxious.          Objective:      Blood pressure 125/80, pulse 79, height 5' 4" (1.626 m), weight 98 kg (216 lb). Body mass index is 37.08 kg/m².  Physical Exam   Constitutional: She is oriented to person, place, and time. She appears well-developed. She is cooperative. No distress.   BMI is 37.   HENT:   Head: Normocephalic and atraumatic.   Right Ear: Tympanic membrane normal.   Left Ear: Tympanic membrane normal.   Eyes: Pupils are equal, round, and reactive to light. Conjunctivae, EOM and lids are normal. Lids are everted and swept, no foreign bodies found. Right pupil is round and reactive. Left pupil is round and reactive.   Neck: Trachea normal and normal range of motion. Neck supple.   Cardiovascular: Normal rate, regular rhythm, S1 normal, S2 normal and normal heart sounds.   Pulmonary/Chest: Breath sounds normal.   Abdominal: Soft. Bowel sounds are normal. There is no rigidity and no guarding.   Musculoskeletal:        Right hand: She exhibits " tenderness. She exhibits no deformity. Normal sensation noted.        Left hand: She exhibits no tenderness and no deformity.   Lymphadenopathy:     She has no cervical adenopathy.     She has no axillary adenopathy.   Neurological: She is alert and oriented to person, place, and time.   Skin: Skin is warm and dry.   Psychiatric: Her mood appears anxious. Her affect is labile. Her speech is rapid and/or pressured.   Chronically labile and has to be redirected to focus on her presenting medical issues rather than present different issues indiscriminately.   Nursing note and vitals reviewed.        Assessment:       1. Flexural atopic dermatitis    2. Screening for breast cancer    3. Snoring    4. Other fatigue    5. Fibromyalgia    6. Anxiety           Office Visit on 07/16/2019   Component Date Value Ref Range Status    CRP 07/16/2019 0.67  0.00 - 1.40 mg/dL Final    Total Protein 07/16/2019 7.2  6.0 - 8.5 g/dL Final    Albumin 07/16/2019 3.8  2.9 - 4.4 g/dL Final    Alpha 1 Globulin/Protein Total 07/16/2019 0.3  0.0 - 0.4 g/dL Final    Alpha 2 Globulin/Protein Total 07/16/2019 0.8  0.4 - 1.0 g/dL Final    Beta Globulin 07/16/2019 1.2  0.7 - 1.3 g/dL Final    Gamma Globulin 07/16/2019 1.2  0.4 - 1.8 g/dL Final    Albumin/Globulin Ratio 07/16/2019 1.1  0.7 - 1.7 Final    M-Eliud, % 07/16/2019 Not Observed  Not Observed g/dL Final    Globulin, Total 07/16/2019 3.4  2.2 - 3.9 g/dL Final    Please Note: 07/16/2019 Comment   Final         Plan:           Flexural atopic dermatitis  -     Discontinue: fluocinonide 0.05% (LIDEX) 0.05 % cream; Apply topically 2 (two) times daily.  Dispense: 15 g; Refill: 0  -     triamcinolone acetonide 0.1% (KENALOG) 0.1 % cream; Apply topically 2 (two) times daily.  Dispense: 15 g; Refill: 1    Screening for breast cancer  -     Mammo Digital Screening Bilat; Future; Expected date: 09/16/2019    Snoring  -     Ambulatory referral to Sleep Disorders    Other fatigue  -      Ambulatory referral to Sleep Disorders  -     TSH; Future; Expected date: 09/16/2019    Fibromyalgia    Anxiety      Patient seems to have multiple medical issues and seems to be scattered and non focused.    Today I took the top 3 or 4 medical issues including rash on the Flexeril aspect, snoring and symptoms of fatigue and fibromyalgia.    History of snoring has been reviewed and I will try to make a referral for sleep apnea evaluation if feasible and available for Medicaid patients.    Seizures are stable and she has an appointment and follow up established with a neurologist who will take over the prescription of her seizure medications.    Tobacco good dependency continues to be a problem    Mood dysphoric disorder continues to be an issue has she does not have any  judgment of situation  and also a modicum of respect for the providers time.  (I am limited in my time by my need to take care of other patient's, but patient is extremely expansive and wants all the issues to be resolved immediately.)    She has been advised to quit smoking.    She also needs a Pap smear and will be referred to my colleague Ms Umm Park for the same.    I have advised her to address 1 problem at a time and not be confounded by multiple issues.    I have made Corrections in the medical chart.    Patient seen with chaperone.    Spent more than 25 minutes with patient which involved review of his medical conditions, labs, medications and with 50% of time face-to-face discussion about medical problems, management and any applicable changes.      Current Outpatient Medications:     albuterol (PROVENTIL/VENTOLIN HFA) 90 mcg/actuation inhaler, INHALE ONE PUFF BY MOUTH INTO THE LUNGS EVERY 4 TO 6 HOURS AS NEEDED FOR WHEEZING, Disp: 18 each, Rfl: 5    ergocalciferol (ERGOCALCIFEROL) 50,000 unit Cap, Take 1 capsule (50,000 Units total) by mouth every 7 days., Disp: 1 capsule, Rfl: 11    folic acid (FOLVITE) 1 MG tablet, Take 1 tablet (1  mg total) by mouth once daily., Disp: 30 tablet, Rfl: 2    ondansetron (ZOFRAN-ODT) 4 MG TbDL, Take 1 tablet (4 mg total) by mouth every 6 (six) hours as needed., Disp: 30 tablet, Rfl: 2    phenytoin (DILANTIN) 100 MG ER capsule, Take 3 capsules (300 mg total) by mouth once daily., Disp: 90 capsule, Rfl: 2    triamcinolone acetonide 0.1% (KENALOG) 0.1 % cream, Apply topically 2 (two) times daily., Disp: 15 g, Rfl: 1

## 2019-09-16 NOTE — PATIENT INSTRUCTIONS
What is Atopic Dermatitis?  Atopic dermatitis (also called eczema) causes chronic skin irritation. It is often found in infants, teens, and adults. This disease often runs in families (is genetic). It may also be linked to allergies, such as hay fever and sometimes asthma. Patches of skin become dry, red, itchy, and scaly. In older adults, abnormally dry skin is often called xerosis. Sometimes eczema is only on the hands or feet. It often improves when the skin is well hydrated. It gets worse when the skin is dry. You can help control symptoms by practicing good self-care. Avoid anything that causes flare-ups (such as sunburn or vigorous scratching).  Where do you have symptoms?  Atopic dermatitis symptoms can appear anywhere on the body. But in most cases they vary based on the persons age. In infants, irritation is often seen on the cheeks, chin, near the mouth, and under the eyelids. In children ages 2 through 10, skin folds, such as the backs of the knees, or in the arm crease, are most often affected. In children 11 and older and in adults, symptoms can affect many areas.  What triggers symptoms?  Symptoms flare because of many things. These include skin dryness, scratching, stress, harsh soaps, and irritants such as dust or wool. Try to avoid anything that causes flare-ups.  Recognizing what causes flare-ups  To figure out what causes atopic dermatitis to flare, keep a list of things that seem to affect your skin. Start by filling in the spaces below. Then keep writing them down in a notebook or diary. The things that affect each person vary. So keep your own list and try to avoid your triggers.    Date Last Reviewed: 2/1/2017  © 4757-0483 [employer]. [address] [place] [address], [place] [address]. All rights reserved. This information is not intended as a substitute for professional medical care. Always follow your healthcare professional's instructions.

## 2019-10-10 DIAGNOSIS — L20.89 FLEXURAL ATOPIC DERMATITIS: ICD-10-CM

## 2019-10-10 RX ORDER — TRIAMCINOLONE ACETONIDE 1 MG/G
CREAM TOPICAL
Qty: 30 G | Refills: 1 | Status: SHIPPED | OUTPATIENT
Start: 2019-10-10 | End: 2020-01-02

## 2019-10-10 RX ORDER — FLUOCINONIDE 0.5 MG/G
CREAM TOPICAL
Refills: 0 | OUTPATIENT
Start: 2019-10-10

## 2019-10-16 ENCOUNTER — INITIAL CONSULT (OUTPATIENT)
Dept: PULMONOLOGY | Facility: CLINIC | Age: 44
End: 2019-10-16
Payer: MEDICAID

## 2019-10-16 VITALS
BODY MASS INDEX: 38.8 KG/M2 | SYSTOLIC BLOOD PRESSURE: 120 MMHG | DIASTOLIC BLOOD PRESSURE: 70 MMHG | HEART RATE: 93 BPM | HEIGHT: 63 IN | OXYGEN SATURATION: 98 % | WEIGHT: 219 LBS

## 2019-10-16 DIAGNOSIS — G47.33 OSA (OBSTRUCTIVE SLEEP APNEA): ICD-10-CM

## 2019-10-16 DIAGNOSIS — Z72.0 TOBACCO ABUSE: Primary | ICD-10-CM

## 2019-10-16 PROCEDURE — 99204 PR OFFICE/OUTPT VISIT, NEW, LEVL IV, 45-59 MIN: ICD-10-PCS | Mod: S$GLB,,, | Performed by: INTERNAL MEDICINE

## 2019-10-16 PROCEDURE — 99204 OFFICE O/P NEW MOD 45 MIN: CPT | Mod: S$GLB,,, | Performed by: INTERNAL MEDICINE

## 2019-10-16 NOTE — PROGRESS NOTES
"  SUBJECTIVE:    Patient ID: Jak Cody is a 43 y.o. female.    Chief Complaint: Sleep Apnea (ref by dr wheeler always tired )    HPI     Patient referred from Dr. Wheeler for probable sleep apnea.  She is chronically fatigued.  She wakes up in the morning fatigued. Her brother has severe MIMI.   She states that she has COPD, actively smokes, uses albuterol as needed.  She does not recall having PFT, has been listened to and told she has COPD.  She does not want to even consider smoking cessation because she is in a pressure cooker".  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, controlled     Hypovitaminosis D 3/28/2018    As per Neurlogy notes at [facility] -Dr Rochelle Bennett MD neurology started on Vitamin D 50,000    Seizures      Past Surgical History:   Procedure Laterality Date    FRACTURE SURGERY  2000    left femur     Family History   Problem Relation Age of Onset    Arthritis Mother     Asthma Mother     COPD Mother     Depression Mother     Diabetes Mother     Headaches Mother     Cancer Maternal Grandfather     Heart disease Paternal Grandfather     Heart attack Paternal Grandfather         Social History:   Marital Status: Single  Occupation: Data Unavailable  Alcohol History:  reports that she does not drink alcohol.  Tobacco History:  reports that she has been smoking cigarettes. She has been smoking about 0.50 packs per day. She has never used smokeless tobacco.  Drug History:  reports that she does not use drugs.    Review of patient's allergies indicates:  No Known Allergies    Current Outpatient Medications   Medication Sig Dispense Refill    albuterol (PROVENTIL/VENTOLIN HFA) 90 mcg/actuation inhaler INHALE ONE PUFF BY MOUTH INTO THE LUNGS EVERY 4 TO 6 HOURS AS NEEDED FOR WHEEZING 18 each 5    folic acid (FOLVITE) 1 MG tablet Take 1 tablet (1 " "mg total) by mouth once daily. 30 tablet 2    ondansetron (ZOFRAN-ODT) 4 MG TbDL Take 1 tablet (4 mg total) by mouth every 6 (six) hours as needed. 30 tablet 2    phenytoin (DILANTIN) 100 MG ER capsule Take 3 capsules (300 mg total) by mouth once daily. 90 capsule 2    triamcinolone acetonide 0.1% (KENALOG) 0.1 % cream APPLY  CREAM TOPICALLY TWICE DAILY 30 g 1    ergocalciferol (ERGOCALCIFEROL) 50,000 unit Cap Take 1 capsule (50,000 Units total) by mouth every 7 days. (Patient not taking: Reported on 10/16/2019) 1 capsule 11     No current facility-administered medications for this visit.            Review of Systems  General: chronically fatigued, has been told that she snores badly and stops breathing in the night   Eyes: Vision is good.  ENT:  No sinusitis or pharyngitis.   Heart:: chest pain occasionally   Lungs: dyspnea with exertion  GI: GERD  : wakes up 5-6 times a night to urinate  Musculoskeletal: No joint pain or myalgias.  Skin: No lesions or rashes.  Neuro: epilepsy, last seizure was a few months ago.   Lymph: No edema or adenopathy.  Psych: very grumpy in the morning .  Endo: No weight change.    OBJECTIVE:      /70 (BP Location: Left arm, Patient Position: Sitting)   Pulse 93   Ht 5' 3" (1.6 m)   Wt 99.3 kg (219 lb)   SpO2 98%   BMI 38.79 kg/m²     Physical Exam  GENERAL: Older patient in no distress.  HEENT: Pupils equal and reactive. Extraocular movements intact. Nose intact.     malampatti 2  Pharynx moist.  NECK: Supple. 16.5 inches   HEART: Regular rate and rhythm. No murmur or gallop auscultated.  LUNGS: Clear to auscultation and percussion. Lung excursion symmetrical. No change in fremitus. No adventitial noises.  ABDOMEN: Bowel sounds present. Non-tender, no masses palpated.  EXTREMITIES: Normal muscle tone and joint movement, clubbed   LYMPHATICS: No adenopathy palpated, no edema.  SKIN: Dry, intact, no lesions.   NEURO: Cranial nerves II-XII intact. Motor strength 5/5 " "bilaterally, upper and lower extremities.  PSYCH: Appropriate affect.    Michelle Lujan NP served in the capacity as a "scribe" for this patient encounter.  A "face to face" encounter occurred with Dr. Maria on this date  The treatment plan and medical decision making is outlined below:         Assessment:       1. Tobacco abuse    2. MIMI (obstructive sleep apnea)          Plan:       Tobacco abuse  -     Complete PFT with bronchodilator; Future    MIMI (obstructive sleep apnea)  -     Polysomnogram (CPAP will be added if patient meets diagnostic criteria.); Future; Expected date: 10/16/2019       Sleep [facility] at [facility]  PFT  Should quit smoking   No follow-ups on file.        "

## 2019-10-16 NOTE — PATIENT INSTRUCTIONS
Obstructive Sleep Apnea  Obstructive sleep apnea is a condition that causes your air passages to become narrowed or blocked during sleep. As a result, breathing stops for short periods. Your body wakes up enough for breathing to begin again, though you don't remember it. The cycle of stopped breathing and brief awakenings can repeat dozens of times a night. This prevents the body from getting to the deeper stages of sleep that are needed for good rest and may cause your body's oxygen level to fall.  Signs of sleep apnea include loud snoring, noisy breathing, and gasping sounds during sleep. Daytime symptoms include waking up tired after a full night's sleep, waking up with headaches, feeling very sleepy or falling asleep during the day, and having problems with memory or concentration.  Risk factors for sleep apnea include:  · Being overweight  · Being a man, or a woman in menopause  · Smoking  · Using alcohol or sedating medicines  · Having enlarged structures in the nose or throat  Home care  Lifestyle changes that can help treat snoring and sleep apnea include the following:  · If you are overweight, lose weight. Talk to your healthcare provider about a weight-loss plan for you.  · Avoid alcohol for 3 to 4 hours before bedtime. Avoid sedating medications. Ask your healthcare provider about the medicines you take.  · If you smoke, talk to your healthcare provider about ways to quit.  · Sleep on your side. This can help prevent gravity from pulling relaxed throat tissues into your breathing passages.  · If you have allergies or sinus problems that block your nose, ask your healthcare provider for help.  Follow-up care  Follow up with your healthcare provider, or as advised. A diagnosis of sleep apnea is made with a sleep study. Your healthcare provider can tell you more about this test.  When to seek medical advice  Sleep apnea can make you more likely to have certain health problems. These include high blood  pressure, heart attack, stroke, and sexual dysfunction. If you have sleep apnea, talk to your healthcare provider about the best treatments for you.  Date Last Reviewed: 4/1/2017  © 7849-5606 [employer]. [address] [place] [address], [place] [address]. All rights reserved. This information is not intended as a substitute for professional medical care. Always follow your healthcare professional's instructions.       Sleep study at [facility]  Should quit smoking

## 2019-10-16 NOTE — LETTER
October 16, 2019      Reymundo Serra MD  [address]  [address]  [place] [address]           [facility] - Pulmonology  [address]  [place] [address]  Phone: [phone]  Fax: [phone]          Patient: Jak Cody   MR Number: [id number]   YOB: 1975   Date of Visit: 10/16/2019       Dear Dr. Reymundo Serra:    Thank you for referring Jak Cody to me for evaluation. Attached you will find relevant portions of my assessment and plan of care.    If you have questions, please do not hesitate to call me. I look forward to following Jak Cody along with you.    Sincerely,    Georgina Maria MD    Enclosure  CC:  No Recipients    If you would like to receive this communication electronically, please contact [email or web address] or [phone] to request more information on [employer] Link access.    For providers and/or their staff who would like to refer a patient to [facility], please contact us through our one-stop-shop provider referral line, [facility], at [phone].    If you feel you have received this communication in error or would no longer like to receive these types of communications, please e-mail [email or web address]

## 2019-10-17 ENCOUNTER — DOCUMENTATION ONLY (OUTPATIENT)
Dept: PULMONOLOGY | Facility: CLINIC | Age: 44
End: 2019-10-17

## 2019-11-11 ENCOUNTER — OFFICE VISIT (OUTPATIENT)
Dept: FAMILY MEDICINE | Facility: CLINIC | Age: 44
End: 2019-11-11
Payer: MEDICAID

## 2019-11-11 VITALS
HEIGHT: 63 IN | HEART RATE: 78 BPM | DIASTOLIC BLOOD PRESSURE: 84 MMHG | BODY MASS INDEX: 38.98 KG/M2 | SYSTOLIC BLOOD PRESSURE: 129 MMHG | WEIGHT: 220 LBS

## 2019-11-11 DIAGNOSIS — R20.0 NUMBNESS OF FINGERS: Chronic | ICD-10-CM

## 2019-11-11 DIAGNOSIS — M79.7 FIBROMYALGIA: Primary | Chronic | ICD-10-CM

## 2019-11-11 DIAGNOSIS — R68.3 CLUBBING OF NAILS: ICD-10-CM

## 2019-11-11 DIAGNOSIS — R53.83 OTHER FATIGUE: Chronic | ICD-10-CM

## 2019-11-11 PROBLEM — L20.89 FLEXURAL ATOPIC DERMATITIS: Status: RESOLVED | Noted: 2019-09-16 | Resolved: 2019-11-11

## 2019-11-11 PROCEDURE — 99214 OFFICE O/P EST MOD 30 MIN: CPT | Mod: S$PBB,,, | Performed by: INTERNAL MEDICINE

## 2019-11-11 PROCEDURE — 99214 PR OFFICE/OUTPT VISIT, EST, LEVL IV, 30-39 MIN: ICD-10-PCS | Mod: S$PBB,,, | Performed by: INTERNAL MEDICINE

## 2019-11-11 PROCEDURE — 99214 OFFICE O/P EST MOD 30 MIN: CPT | Performed by: INTERNAL MEDICINE

## 2019-11-11 NOTE — PROGRESS NOTES
Subjective:       Patient ID: Jak Cody is a 43 y.o. female.    Chief Complaint: Hand Pain (wrist pain ); Fibromyalgia; Fatigue; and Clubbing    Patient is a 43-year-old  female who comes follow-up.  Underlying medical issues of feeling fatigued, having aches and pains all over the body have been reviewed again.  She has a chronic smoker.  Intermittent use of Marijuana also.  History of seizure has been noted which seems to be stable on her Dilantin medication.  She has re-established with another neurologist at this point.    Several  months ago she had fallen down on her outstretched hand and sustained an injury on her right wrist.  I had obtained x-ray which was unremarkable for intact her.  She continues to have pain and tingling and multiple fingers from index to the little finger.  The thumb seems to be spared.  She experiences pain more when she flexes or extends her wrist and tries to have a good .    She also expresses concerns about multiple issues of mis- diagnosis mentioned in her chart.  These included miscarriages.  These were corrected in past.    She also had seen Dr. Maria recently who detected mild clubbing in her fingers.  She has been recommended sleep apnea evaluation to be sure that she does not have any hypoxemic episodes which may cause seizures.    Hand Pain    The incident occurred more than 1 week ago (Several months ago). The injury mechanism was a fall. The pain is present in the right hand, right fingers and right wrist. The quality of the pain is described as aching and shooting. The pain is at a severity of 8/10. The pain is moderate. The pain has been constant since the incident. Associated symptoms include numbness. Pertinent negatives include no chest pain. The symptoms are aggravated by palpation. She has tried immobilization for the symptoms. The treatment provided no relief.   Fibromyalgia   This is a chronic problem. The current episode started more than 1  year ago. The problem occurs constantly. The problem has been unchanged. Associated symptoms include arthralgias, fatigue, myalgias and numbness. Pertinent negatives include no chest pain, chills, congestion, coughing, fever, headaches, joint swelling, nausea, sore throat or vomiting. The symptoms are aggravated by smoking. She has tried rest and relaxation for the symptoms. The treatment provided no relief.   Fatigue   This is a chronic problem. The current episode started more than 1 year ago. The problem occurs constantly. The problem has been waxing and waning. Associated symptoms include arthralgias, fatigue, myalgias and numbness. Pertinent negatives include no chest pain, chills, congestion, coughing, fever, headaches, joint swelling, nausea, sore throat or vomiting. The symptoms are aggravated by exertion. The treatment provided no relief.       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, controlled     Hypovitaminosis D 3/28/2018    As per [facility]urlog[facility] at [facility] -Dr Rochelle Bennett MD neurology started on Vitamin D 50,000    Seizures      Social History     Socioeconomic History    Marital status: Single     Spouse name: Not on file    Number of children: Not on file    Years of education: Not on file    Highest education level: Not on file   Occupational History    Occupation: disabled   Social Needs    Financial resource strain: Not on file    Food insecurity:     Worry: Not on file     Inability: Not on file    Transportation needs:     Medical: Not on file     Non-medical: Not on file   Tobacco Use    Smoking status: Current Every Day Smoker     Packs/day: 0.50     Types: Cigarettes    Smokeless tobacco: Never Used   Substance and Sexual Activity    Alcohol use: No    Drug use: Yes     Types: Marijuana    Sexual activity: Yes     Partners: Male    Lifestyle    Physical activity:     Days per week: Not on file     Minutes per session: Not on file    Stress: To some extent   Relationships    Social connections:     Talks on phone: Not on file     Gets together: Not on file     Attends Christian service: Not on file     Active member of club or organization: Not on file     Attends meetings of clubs or organizations: Not on file     Relationship status: Not on file   Other Topics Concern    Not on file   Social History Narrative    Not on file     Past Surgical History:   Procedure Laterality Date    FRACTURE SURGERY  2000    left femur     Family History   Problem Relation Age of Onset    Arthritis Mother     Asthma Mother     COPD Mother     Depression Mother     Diabetes Mother     Headaches Mother     Cancer Maternal Grandfather     Heart disease Paternal Grandfather     Heart attack Paternal Grandfather        Review of Systems   Constitutional: Positive for fatigue. Negative for activity change, chills, fever and unexpected weight change.   HENT: Negative for congestion, postnasal drip, sinus pressure and sore throat.    Eyes: Negative for pain, discharge and visual disturbance.   Respiratory: Positive for apnea (Snoring). Negative for cough, chest tightness and shortness of breath.    Cardiovascular: Negative for chest pain, palpitations and leg swelling.   Gastrointestinal: Negative for abdominal distention, diarrhea, nausea and vomiting.   Genitourinary: Negative for difficulty urinating, dysuria, flank pain and frequency.        Reg menstrual cycles   Musculoskeletal: Positive for arthralgias and myalgias. Negative for joint swelling.        Rheumatoid arthritis   Skin: Negative for color change and pallor.   Allergic/Immunologic: Negative for environmental allergies, food allergies and immunocompromised state.   Neurological: Positive for seizures and numbness. Negative for dizziness, speech difficulty, light-headedness and headaches.  "       Numbness in the right hand fingers.  Index, middle, ring and little finger.  Thumb seems to be spared.   Hematological: Negative for adenopathy. Does not bruise/bleed easily.   Psychiatric/Behavioral: Positive for dysphoric mood. Negative for agitation and confusion. The patient is not nervous/anxious.          Objective:      Blood pressure 129/84, pulse 78, height 5' 3" (1.6 m), weight 99.8 kg (220 lb). Body mass index is 38.97 kg/m².  Physical Exam   Constitutional: She is oriented to person, place, and time. She appears well-developed. She is cooperative. No distress.   BMI is 38   HENT:   Head: Normocephalic and atraumatic.   Right Ear: Tympanic membrane normal.   Left Ear: Tympanic membrane normal.   Eyes: Conjunctivae, EOM and lids are normal. Lids are everted and swept, no foreign bodies found. No scleral icterus. Right pupil is round and reactive. Left pupil is round and reactive.   Neck: Trachea normal and normal range of motion. Neck supple. No tracheal deviation present. No thyromegaly present.   Cardiovascular: Normal rate, regular rhythm, S1 normal, S2 normal and normal heart sounds.   Pulmonary/Chest: Breath sounds normal. No stridor. No respiratory distress.   Abdominal: Soft. Bowel sounds are normal. There is no rigidity and no guarding.   Musculoskeletal:        Right hand: She exhibits tenderness. She exhibits no deformity. Normal sensation noted.        Left hand: She exhibits no tenderness and no deformity.        Hands:  Mild Clubbing   Lymphadenopathy:     She has no cervical adenopathy.     She has no axillary adenopathy.   Neurological: She is alert and oriented to person, place, and time.   Skin: Skin is warm and dry. No rash noted.   Psychiatric: Her mood appears anxious. Her affect is labile. Her speech is rapid and/or pressured.   Chronically labile and has to be redirected to focus on her presenting medical issues rather than present different issues indiscriminately.   Nursing " note and vitals reviewed.        Assessment:       1. Fibromyalgia    2. Other fatigue    3. Clubbing of nails    4. Numbness of fingers           No visits with results within 3 Month(s) from this visit.   Latest known visit with results is:   Office Visit on 07/16/2019   Component Date Value Ref Range Status    CRP 07/16/2019 0.67  0.00 - 1.40 mg/dL Final    Total Protein 07/16/2019 7.2  6.0 - 8.5 g/dL Final    Albumin 07/16/2019 3.8  2.9 - 4.4 g/dL Final    Alpha 1 Globulin/Protein Total 07/16/2019 0.3  0.0 - 0.4 g/dL Final    Alpha 2 Globulin/Protein Total 07/16/2019 0.8  0.4 - 1.0 g/dL Final    Beta Globulin 07/16/2019 1.2  0.7 - 1.3 g/dL Final    Gamma Globulin 07/16/2019 1.2  0.4 - 1.8 g/dL Final    Albumin/Globulin Ratio 07/16/2019 1.1  0.7 - 1.7 Final    M-Eliud, % 07/16/2019 Not Observed  Not Observed g/dL Final    Globulin, Total 07/16/2019 3.4  2.2 - 3.9 g/dL Final    Please Note: 07/16/2019 Comment   Final         Plan:           Fibromyalgia  Comments:  Patient has been advised to quit smoking.  Keep hydrated.  Stretch exercises.  Weight management important.    Other fatigue  Comments:  Cause of fatigue uncertain and consider sleep apnea.  Possible some degree of mood disorder also.    Clubbing of nails  Comments:  Incidental finding initially by Dr. Georgina Maria.  The nails show some curvature.  Thus far she does not have any diagnosis of hypoxia.    Numbness of fingers  Comments:  History of fall with injury on the wrist.  X-ray was negative.  Possibly some peripheral neuropathy.  Check with Orthopedic.  Orders:  -     Ambulatory referral/consult to Orthopedics; Future; Expected date: 11/11/2019     I had a long discussion with multiple issues with patient.  Reviewed record again for accuracy and any factually in correct and trees have already been removed.    Patient's claim that she is not disabled is not supported by her multiple complains and being on Medicaid.    She has been  advised to quit smoking.  Marijuana does not have a clinical role in her symptoms at this point.    Orthopedic referral will be made for right wrist numbness.  I will review medical records for any possible autoimmune disorder or any other possible causes for her to have mild clubbing if she has it.    She should get her gynecological checkup from my colleague miss juanito Park Also.      Fup 6 months          Current Outpatient Medications:     albuterol (PROVENTIL/VENTOLIN HFA) 90 mcg/actuation inhaler, INHALE ONE PUFF BY MOUTH INTO THE LUNGS EVERY 4 TO 6 HOURS AS NEEDED FOR WHEEZING, Disp: 18 each, Rfl: 5    ergocalciferol (ERGOCALCIFEROL) 50,000 unit Cap, Take 1 capsule (50,000 Units total) by mouth every 7 days., Disp: 1 capsule, Rfl: 11    folic acid (FOLVITE) 1 MG tablet, Take 1 tablet (1 mg total) by mouth once daily., Disp: 30 tablet, Rfl: 2    ondansetron (ZOFRAN-ODT) 4 MG TbDL, Take 1 tablet (4 mg total) by mouth every 6 (six) hours as needed., Disp: 30 tablet, Rfl: 2    phenytoin (DILANTIN) 100 MG ER capsule, Take 3 capsules (300 mg total) by mouth once daily., Disp: 90 capsule, Rfl: 2    triamcinolone acetonide 0.1% (KENALOG) 0.1 % cream, APPLY  CREAM TOPICALLY TWICE DAILY, Disp: 30 g, Rfl: 1

## 2019-11-11 NOTE — PATIENT INSTRUCTIONS
Managing Fibromyalgia    Fibromyalgia is a chronic illness that causes pain in specific points on the body, stiffness, and fatigue. But it doesnt have to keep you from doing what you enjoy. You can feel better. You and your healthcare provider can work together to develop a plan.  Take medications as directed  You may be given medications to help reduce pain and improve sleep.  Several medications are approved to treat fibromyalgia. Two were originally designed to treat depression. They are duloxetine, and milnacipran. A third, called pregaballin, was developed to treat nerve pain. Other medications include pain relievers such as acetaminophen or stronger narcotics. These may be prescribed short term.  NSAIDs (non-steroidal anti-inflammatory drugs) include aspirin, ibuprofen and naproxen are used to relieve pain.  Get exercise  Gentle exercise can help lessen your pain. Try these tips:  · Choose activities that are gentle on your joints, such as walking, biking, and swimming or other water exercises.  · Dont push yourself too hard. Build up your strength and endurance slowly, over time.  · Stick to it. For the most relief, exercise should become part of your daily life.  Get a good nights rest  To help you get more sleep, try the tips below:  · Sleep only in a bed, not on a couch or chair.  · Dont watch TV, read, or work in bed.  · Go to bed and get up at the same time each day.  · Try to avoid naps.  · Avoid alcohol, caffeine, and tobacco for at least 3 hours before going to bed.  · Avoid fluids in the evening to avoid having to get up to urinate.  Other things you can do  No one knows what causes fibromyalgia. But stress, poor eating habits, and extra weight can make it worse. These tips may help you feel better:  · Eat a balanced diet with plenty of fruits and vegetables, whole grains, lean protein, and low-fat or nonfat dairy products.  · Maintain a healthy weight.  · Learn ways to reduce or manage the  stress in your life.  · Ask your healthcare provider for resources to help you make changes. Or check out the resources below.  Resources  · Arthritis Foundation  www.arthritis.org  · National Fibromyalgia Association  www.fmaware.org  · American Fibromyalgia Syndrome Association  www.afsafund.org  Date Last Reviewed: 2/14/2016  © 9446-9780 [employer]. [address] [place] [address], [place] [address]. All rights reserved. This information is not intended as a substitute for professional medical care. Always follow your healthcare professional's instructions.

## 2019-12-04 ENCOUNTER — TELEPHONE (OUTPATIENT)
Dept: PULMONOLOGY | Facility: CLINIC | Age: 44
End: 2019-12-04

## 2019-12-04 NOTE — TELEPHONE ENCOUNTER
----- Message from Saamntha Nelson sent at 12/4/2019  8:40 AM CST -----   Looking for results of sleep study done on 11/26/2019. Call [phone]

## 2019-12-04 NOTE — TELEPHONE ENCOUNTER
----- Message from Samantha Nelson sent at 12/4/2019  8:40 AM CST -----   Looking for results of sleep study done on 11/26/2019. Call [phone]

## 2019-12-05 ENCOUNTER — TELEPHONE (OUTPATIENT)
Dept: PULMONOLOGY | Facility: CLINIC | Age: 44
End: 2019-12-05

## 2019-12-05 NOTE — TELEPHONE ENCOUNTER
----- Message from Jelena Brennan sent at 12/5/2019  9:21 AM CST -----  Contact: Jak  Please call with sleep study results.  She does have an appointment for 12/19/19 but wants to know if she has sleep apnea.      Thank You        Jelena   (2) potential problem

## 2019-12-06 ENCOUNTER — TELEPHONE (OUTPATIENT)
Dept: PULMONOLOGY | Facility: CLINIC | Age: 44
End: 2019-12-06

## 2019-12-06 DIAGNOSIS — G47.33 OSA (OBSTRUCTIVE SLEEP APNEA): Primary | ICD-10-CM

## 2019-12-06 NOTE — TELEPHONE ENCOUNTER
PSG showed mild to moderate MIMI.  Will order CPAP titration study.   The patient is aware. She has called every day for the results of the study.

## 2019-12-31 ENCOUNTER — TELEPHONE (OUTPATIENT)
Dept: PULMONOLOGY | Facility: CLINIC | Age: 44
End: 2019-12-31

## 2019-12-31 DIAGNOSIS — G47.33 OSA (OBSTRUCTIVE SLEEP APNEA): Primary | ICD-10-CM

## 2019-12-31 NOTE — TELEPHONE ENCOUNTER
Try to reach patient to discuss her sleep study results as requested.  There was no answer.  Her CPAP has been ordered.

## 2020-01-02 ENCOUNTER — TELEPHONE (OUTPATIENT)
Dept: PULMONOLOGY | Facility: CLINIC | Age: 45
End: 2020-01-02

## 2020-01-02 ENCOUNTER — OFFICE VISIT (OUTPATIENT)
Dept: FAMILY MEDICINE | Facility: CLINIC | Age: 45
End: 2020-01-02
Payer: MEDICAID

## 2020-01-02 VITALS
SYSTOLIC BLOOD PRESSURE: 100 MMHG | TEMPERATURE: 98 F | HEIGHT: 63 IN | DIASTOLIC BLOOD PRESSURE: 68 MMHG | WEIGHT: 225.31 LBS | BODY MASS INDEX: 39.92 KG/M2 | HEART RATE: 98 BPM | OXYGEN SATURATION: 97 %

## 2020-01-02 DIAGNOSIS — R11.0 NAUSEA: ICD-10-CM

## 2020-01-02 DIAGNOSIS — J06.9 UPPER RESPIRATORY TRACT INFECTION, UNSPECIFIED TYPE: Primary | ICD-10-CM

## 2020-01-02 DIAGNOSIS — R68.89 FLU-LIKE SYMPTOMS: ICD-10-CM

## 2020-01-02 LAB
CTP QC/QA: YES
FLUAV AG NPH QL: NEGATIVE
FLUBV AG NPH QL: NEGATIVE

## 2020-01-02 PROCEDURE — 99214 OFFICE O/P EST MOD 30 MIN: CPT | Performed by: NURSE PRACTITIONER

## 2020-01-02 PROCEDURE — 99213 PR OFFICE/OUTPT VISIT, EST, LEVL III, 20-29 MIN: ICD-10-PCS | Mod: S$PBB,,, | Performed by: NURSE PRACTITIONER

## 2020-01-02 PROCEDURE — 87804 INFLUENZA ASSAY W/OPTIC: CPT | Mod: PBBFAC | Performed by: NURSE PRACTITIONER

## 2020-01-02 PROCEDURE — S0119 ONDANSETRON 4 MG: HCPCS | Mod: PBBFAC | Performed by: NURSE PRACTITIONER

## 2020-01-02 PROCEDURE — 99213 OFFICE O/P EST LOW 20 MIN: CPT | Mod: S$PBB,,, | Performed by: NURSE PRACTITIONER

## 2020-01-02 RX ORDER — FLUTICASONE PROPIONATE 50 MCG
1 SPRAY, SUSPENSION (ML) NASAL DAILY
Qty: 18.2 ML | Refills: 0 | Status: SHIPPED | OUTPATIENT
Start: 2020-01-02

## 2020-01-02 RX ORDER — BENZONATATE 100 MG/1
100 CAPSULE ORAL 3 TIMES DAILY PRN
Qty: 30 CAPSULE | Refills: 0 | Status: SHIPPED | OUTPATIENT
Start: 2020-01-02 | End: 2020-01-12

## 2020-01-02 RX ORDER — ONDANSETRON 4 MG/1
4 TABLET, ORALLY DISINTEGRATING ORAL
Status: COMPLETED | OUTPATIENT
Start: 2020-01-02 | End: 2020-01-02

## 2020-01-02 RX ADMIN — ONDANSETRON 4 MG: 4 TABLET, ORALLY DISINTEGRATING ORAL at 10:01

## 2020-01-02 NOTE — PATIENT INSTRUCTIONS
Viral Upper Respiratory Illness (Adult)  You have a viral upper respiratory illness (URI), which is another term for the common cold. This illness is contagious during the first few days. It is spread through the air by coughing and sneezing. It may also be spread by direct contact (touching the sick person and then touching your own eyes, nose, or mouth). Frequent handwashing will decrease risk of spread. Most viral illnesses go away within 7 to 10 days with rest and simple home remedies. Sometimes the illness may last for several weeks. Antibiotics will not kill a virus, and they are generally not prescribed for this condition.    Home care  · If symptoms are severe, rest at home for the first 2 to 3 days. When you resume activity, don't let yourself get too tired.  · Avoid being exposed to cigarette smoke (yours or others).  · You may use acetaminophen or ibuprofen to control pain and fever, unless another medicine was prescribed. (Note: If you have chronic liver or kidney disease, have ever had a stomach ulcer or gastrointestinal bleeding, or are taking blood-thinning medicines, talk with your healthcare provider before using these medicines.) Aspirin should never be given to anyone under 18 years of age who is ill with a viral infection or fever. It may cause severe liver or brain damage.  · Your appetite may be poor, so a light diet is fine. Avoid dehydration by drinking 6 to 8 glasses of fluids per day (water, soft drinks, juices, tea, or soup). Extra fluids will help loosen secretions in the nose and lungs.  · Over-the-counter cold medicines will not shorten the length of time youre sick, but they may be helpful for the following symptoms: cough, sore throat, and nasal and sinus congestion. (Note: Do not use decongestants if you have high blood pressure.)  Follow-up care  Follow up with your healthcare provider, or as advised.  When to seek medical advice  Call your healthcare provider right away if any  of these occur:  · Cough with lots of colored sputum (mucus)  · Severe headache; face, neck, or ear pain  · Difficulty swallowing due to throat pain  · Fever of 100.4°F (38°C)  Call 911, or get immediate medical care  Call emergency services right away if any of these occur:  · Chest pain, shortness of breath, wheezing, or difficulty breathing  · Coughing up blood  · Inability to swallow due to throat pain  Date Last Reviewed: 9/13/2015  © 0759-1633 [employer]. [address] [place] [address]. All rights reserved. This information is not intended as a substitute for professional medical care. Always follow your healthcare professional's instructions.

## 2020-01-02 NOTE — PROGRESS NOTES
SUBJECTIVE:      Patient ID: Jak Cody is a 44 y.o. female.    Chief Complaint: Sore Throat (Started two days ago); Nasal Congestion; Cough; and Diarrhea    Pt of Dr. Serra's presents for flu-like symptoms. Reports 2-3 days of minimal nausea, congestion, productive cough of yellow-green phlegm, one episode of diarrhea, headaches, rhinorrhea, plugged ear sensation, and sore throat. She has been taking cough drops and Zaira [place] Cold & Flu with little relief of the symptoms. She reports a close friend of hers was sick recently. She sleeps with a CPAP machine for her sleep apnea. Denies CP, SOB, wheezing, vomiting, constipation, numbness, weakness, dizziness, palpitations, fevers, or any other concerns at this time.    URI    This is a new problem. The current episode started in the past 7 days (48-72h ago). The problem has been gradually worsening. There has been no fever. Associated symptoms include congestion, coughing, diarrhea, headaches, nausea, a plugged ear sensation, rhinorrhea and a sore throat. Pertinent negatives include no abdominal pain, chest pain, dysuria, ear pain, joint pain, joint swelling, neck pain, rash, sinus pain, sneezing, swollen glands, vomiting or wheezing. Treatments tried: Zaira [place] Cold & Flu, cough drops. The treatment provided no relief.       Past Surgical History:   Procedure Laterality Date    FRACTURE SURGERY  2000    left femur     Family History   Problem Relation Age of Onset    Arthritis Mother     Asthma Mother     COPD Mother     Depression Mother     Diabetes Mother     Headaches Mother     Cancer Maternal Grandfather     Heart disease Paternal Grandfather     Heart attack Paternal Grandfather       Social History     Socioeconomic History    Marital status: Single     Spouse name: Not on file    Number of children: Not on file    Years of education: Not on file    Highest education level: Not on file   Occupational History    Occupation:  disabled   Social Needs    Financial resource strain: Not on file    Food insecurity:     Worry: Not on file     Inability: Not on file    Transportation needs:     Medical: Not on file     Non-medical: Not on file   Tobacco Use    Smoking status: Current Every Day Smoker     Packs/day: 0.50     Types: Cigarettes    Smokeless tobacco: Never Used   Substance and Sexual Activity    Alcohol use: No    Drug use: Yes     Types: Marijuana    Sexual activity: Yes     Partners: Male   Lifestyle    Physical activity:     Days per week: Not on file     Minutes per session: Not on file    Stress: To some extent   Relationships    Social connections:     Talks on phone: Not on file     Gets together: Not on file     Attends Yazidi service: Not on file     Active member of club or organization: Not on file     Attends meetings of clubs or organizations: Not on file     Relationship status: Not on file   Other Topics Concern    Not on file   Social History Narrative    Not on file     Current Outpatient Medications   Medication Sig Dispense Refill    albuterol (PROVENTIL/VENTOLIN HFA) 90 mcg/actuation inhaler INHALE ONE PUFF BY MOUTH INTO THE LUNGS EVERY 4 TO 6 HOURS AS NEEDED FOR WHEEZING 18 each 5    ondansetron (ZOFRAN-ODT) 4 MG TbDL Take 1 tablet (4 mg total) by mouth every 6 (six) hours as needed. 30 tablet 2    phenytoin (DILANTIN) 100 MG ER capsule Take 3 capsules (300 mg total) by mouth once daily. 90 capsule 2    benzonatate (TESSALON) 100 MG capsule Take 1 capsule (100 mg total) by mouth 3 (three) times daily as needed for Cough. 30 capsule 0    dextromethorphan-guaifenesin  mg (MUCINEX DM)  mg per 12 hr tablet Take 1 tablet by mouth 2 (two) times daily as needed (cough). 20 tablet 0    ergocalciferol (ERGOCALCIFEROL) 50,000 unit Cap Take 1 capsule (50,000 Units total) by mouth every 7 days. (Patient not taking: Reported on 1/2/2020) 1 capsule 11    fluticasone propionate (FLONASE) 50  mcg/actuation nasal spray 1 spray (50 mcg total) by Each Nostril route once daily. 18.2 mL 0    folic acid (FOLVITE) 1 MG tablet Take 1 tablet (1 mg total) by mouth once daily. 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   HENT: Positive for congestion, rhinorrhea, sinus pressure and sore throat. Negative for ear pain, postnasal drip, sinus pain and sneezing.    Eyes: Negative for pain, discharge and visual disturbance.   Respiratory: Positive for cough. Negative for chest tightness, shortness of breath and wheezing.    Cardiovascular: Negative for chest pain, palpitations and leg swelling.   Gastrointestinal: Positive for diarrhea and nausea. Negative for abdominal distention, abdominal pain, blood in stool, constipation and vomiting.   Genitourinary: Negative for difficulty urinating, dysuria, flank pain, frequency, hematuria, pelvic pain, urgency and vaginal discharge.   Musculoskeletal: Negative for back pain, joint pain, joint swelling, myalgias and neck pain.   Skin: Negative for color change, rash and wound.   Neurological: Positive for headaches. Negative  "for dizziness, seizures, syncope, weakness and light-headedness.   Hematological: Negative for adenopathy.   Psychiatric/Behavioral: Negative for agitation, confusion, hallucinations, sleep disturbance and suicidal ideas. The patient is not nervous/anxious.       OBJECTIVE:      Vitals:    01/02/20 0935   BP: 100/68   BP Location: Left arm   Patient Position: Sitting   BP Method: Large (Manual)   Pulse: 98   Temp: 98 °F (36.7 °C)   TempSrc: Oral   SpO2: 97%   Weight: 102.2 kg (225 lb 4.8 oz)   Height: 5' 3" (1.6 m)     Physical Exam   Constitutional: She is oriented to person, place, and time. Vital signs are normal. She appears well-developed and well-nourished. No distress.   HENT:   Head: Normocephalic and atraumatic.   Right Ear: Hearing, external ear and ear canal normal. A middle ear effusion (clear fluid) is present.   Left Ear: Hearing, external ear and ear canal normal. A middle ear effusion (clear fluid) is present.   Nose: Nose normal. No mucosal edema or rhinorrhea.   Mouth/Throat: Uvula is midline and mucous membranes are normal. Oropharyngeal exudate present. No posterior oropharyngeal edema or posterior oropharyngeal erythema.   Eyes: Pupils are equal, round, and reactive to light. Conjunctivae, EOM and lids are normal. Right eye exhibits no discharge. Left eye exhibits no discharge. No scleral icterus.   Neck: Trachea normal, normal range of motion, full passive range of motion without pain and phonation normal. Neck supple. No tracheal deviation present. No thyromegaly present.   Cardiovascular: Normal rate, regular rhythm, normal heart sounds, intact distal pulses and normal pulses. Exam reveals no gallop and no friction rub.   No murmur heard.  Pulmonary/Chest: Effort normal and breath sounds normal. No stridor. No respiratory distress. She has no decreased breath sounds. She has no wheezes. She has no rhonchi. She has no rales.   Abdominal: Soft. Normal appearance and bowel sounds are normal. " There is no tenderness.   Musculoskeletal: Normal range of motion. She exhibits no edema.   Lymphadenopathy:     She has no cervical adenopathy.        Right: No supraclavicular adenopathy present.        Left: No supraclavicular adenopathy present.   Neurological: She is alert and oriented to person, place, and time.   Skin: Skin is warm, dry and intact. Capillary refill takes less than 2 seconds. No rash noted. She is not diaphoretic.   Psychiatric: She has a normal mood and affect. Her speech is normal and behavior is normal. Judgment and thought content normal. Cognition and memory are normal. She expresses no suicidal plans.   Vitals reviewed.     Assessment:       1. Upper respiratory tract infection, unspecified type    2. Nausea    3. Flu-like symptoms        Plan:       Upper respiratory tract infection, unspecified type  Flu was negative in the clinic today. Symptoms are most likely due to viral infection. Advised to take OTC medications such as tylenol and motrin for fever, Mucinex DM or similar for cough, antihistamine and/or decongestant for nasal symptoms. Get plenty of rest and fluids to maintain hydration. Gargle with warm salt water if sore throat is present. Instructed on use of daily Flonase for rhinorrhea and postnasal drip. Prescription given for Tessalon Perles since pt stated she was unsure if she could afford generic Mucinex DM. Instructed she could continue the Zaira [place] Cold & Flu if she could not afford the Mucinex DM. Instructed to return next week if symptoms worsen or persist.  -     dextromethorphan-guaifenesin  mg (MUCINEX DM)  mg per 12 hr tablet; Take 1 tablet by mouth 2 (two) times daily as needed (cough).  Dispense: 20 tablet; Refill: 0  -     fluticasone propionate (FLONASE) 50 mcg/actuation nasal spray; 1 spray (50 mcg total) by Each Nostril route once daily.  Dispense: 18.2 mL; Refill: 0  -     benzonatate (TESSALON) 100 MG capsule; Take 1 capsule (100 mg total)  by mouth 3 (three) times daily as needed for Cough.  Dispense: 30 capsule; Refill: 0    Nausea  Zofran given today in clinic for the nausea she presented with. Nausea resolved before leaving clinic.  -     ondansetron disintegrating tablet 4 mg    Flu-like symptoms  -     POCT Influenza A/B (negative)        Follow up if symptoms worsen or fail to improve.      1/2/2020 REGI Vasquez, FNP

## 2020-01-08 ENCOUNTER — TELEPHONE (OUTPATIENT)
Dept: PULMONOLOGY | Facility: CLINIC | Age: 45
End: 2020-01-08

## 2020-01-08 NOTE — TELEPHONE ENCOUNTER
----- Message from Konstantin Sullivan MA sent at 1/6/2020 11:22 AM CST -----  Contact: Jak      ----- Message -----  From: Jelena Brennan  Sent: 1/3/2020   2:04 PM CST  To: Konstantin Sullivan MA    Patient called stating that she contacted Medicaid about PA for CPAP. Nothing on file.  Patient gave information for Medicaid PA request.  Phone# [phone]  Fax#  [phone]      Jelena

## 2020-01-08 NOTE — TELEPHONE ENCOUNTER
I have made several attempts to call [facility] where she was suppose to get her cpap from to find out what needs to be done an left 2 messages no answer or return phone call .

## 2020-02-12 ENCOUNTER — TELEPHONE (OUTPATIENT)
Dept: PULMONOLOGY | Facility: CLINIC | Age: 45
End: 2020-02-12

## 2020-02-12 DIAGNOSIS — G47.33 OSA (OBSTRUCTIVE SLEEP APNEA): Primary | ICD-10-CM

## 2020-06-11 ENCOUNTER — TELEPHONE (OUTPATIENT)
Dept: PULMONOLOGY | Facility: CLINIC | Age: 45
End: 2020-06-11

## 2020-06-24 NOTE — TELEPHONE ENCOUNTER
CPAP titration study shows she needs to be sleeping on 9 cm of pressure.  Small resmed F-30 full face mask.  Orders placed.    
Called patient she was suppose to have the cpap machine through [facility] I have called the dme an they didn't get a answer   
She received it on janurary 9th an katerine has had it for about 1 month from [facility] d[facility]   
80

## 2023-12-12 ENCOUNTER — TELEPHONE (OUTPATIENT)
Dept: FAMILY MEDICINE | Facility: CLINIC | Age: 48
End: 2023-12-12
Payer: MEDICAID

## 2023-12-12 NOTE — TELEPHONE ENCOUNTER
Caller inquiring if  accepts Medicaid, I advised she is not accepting any new patients at this time.

## 2023-12-12 NOTE — TELEPHONE ENCOUNTER
----- Message from Malik Harrington sent at 12/12/2023  2:17 PM CST -----  Type:  Patient Returning Call    Who Called:Pt     Who Left Message for Patient:Meir Whiteside MA    Does the patient know what this is regarding?:yes     Would the patient rather a call back or a response via [facility]?  Call back     Best Call Back Number: [phone] ([place])     Additional Information:

## 2023-12-12 NOTE — TELEPHONE ENCOUNTER
----- Message from Sadia Potter sent at 12/12/2023 10:52 AM CST -----  Contact: pt  Type:  Needs Medical Advice    Who Called: pt    Would the patient rather a call back or a response via [facility]? 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    Please call to advise  Thanks

## 2023-12-12 NOTE — TELEPHONE ENCOUNTER
----- Message from Sadia Potter sent at 12/12/2023 10:52 AM CST -----  Contact: pt  Type:  Needs Medical Advice    Who Called: pt    Would the patient rather a call back or a response via [facility]? Call back    Best Call Back Number: [phone]    Additional Information: pt seems to think Dr Mcginnis is going to be temp at another office where her primary is and is wanting to know if you take [facility] medicaid.    Please call to advise  Thanks
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
## 2024-08-13 DIAGNOSIS — Z12.31 ENCOUNTER FOR SCREENING MAMMOGRAM FOR MALIGNANT NEOPLASM OF BREAST: Primary | ICD-10-CM

## 2024-08-13 DIAGNOSIS — E06.3 AUTOIMMUNE THYROIDITIS: ICD-10-CM

## 2025-06-17 ENCOUNTER — TELEPHONE (OUTPATIENT)
Dept: FAMILY MEDICINE | Facility: CLINIC | Age: 50
End: 2025-06-17
Payer: MEDICAID

## 2025-06-17 NOTE — TELEPHONE ENCOUNTER
Copied from CRM #[id number]. Topic: Appointments - Appointment Scheduling  >> Dragan 10, 2025  3:08 PM Cherelle Pierce wrote:  Type: Needs Medical Advice  Who Called:  beth   Best Call Back Number: [phone]    Additional Information: beth states she needs to see  a provider about setting up brigitte and getting a refill on medications please call to further assist thank you .

## 2025-06-17 NOTE — TELEPHONE ENCOUNTER
Contacted patient to schedule appt but Dr. Barrientos is not tasking any new Medicaid patients,. Advised her to contact her insurance for a list of Doctors that take he insurance.